# Patient Record
Sex: MALE | Race: WHITE | NOT HISPANIC OR LATINO | URBAN - METROPOLITAN AREA
[De-identification: names, ages, dates, MRNs, and addresses within clinical notes are randomized per-mention and may not be internally consistent; named-entity substitution may affect disease eponyms.]

---

## 2018-09-28 ENCOUNTER — INPATIENT (INPATIENT)
Facility: HOSPITAL | Age: 53
LOS: 2 days | Discharge: HOME | End: 2018-10-01
Attending: SURGERY | Admitting: SURGERY

## 2018-09-28 VITALS
SYSTOLIC BLOOD PRESSURE: 178 MMHG | RESPIRATION RATE: 22 BRPM | OXYGEN SATURATION: 97 % | DIASTOLIC BLOOD PRESSURE: 105 MMHG | HEART RATE: 78 BPM | TEMPERATURE: 97 F | WEIGHT: 259.93 LBS | HEIGHT: 74 IN

## 2018-09-28 LAB
ALBUMIN SERPL ELPH-MCNC: 3.9 G/DL — SIGNIFICANT CHANGE UP (ref 3.5–5.2)
ALBUMIN SERPL ELPH-MCNC: 4.7 G/DL — SIGNIFICANT CHANGE UP (ref 3.5–5.2)
ALP SERPL-CCNC: 113 U/L — SIGNIFICANT CHANGE UP (ref 30–115)
ALP SERPL-CCNC: 79 U/L — SIGNIFICANT CHANGE UP (ref 30–115)
ALT FLD-CCNC: 60 U/L — HIGH (ref 0–41)
ALT FLD-CCNC: 79 U/L — HIGH (ref 0–41)
ANION GAP SERPL CALC-SCNC: 15 MMOL/L — HIGH (ref 7–14)
ANION GAP SERPL CALC-SCNC: 17 MMOL/L — HIGH (ref 7–14)
AST SERPL-CCNC: 27 U/L — SIGNIFICANT CHANGE UP (ref 0–41)
AST SERPL-CCNC: 36 U/L — SIGNIFICANT CHANGE UP (ref 0–41)
BASOPHILS # BLD AUTO: 0.03 K/UL — SIGNIFICANT CHANGE UP (ref 0–0.2)
BASOPHILS # BLD AUTO: 0.03 K/UL — SIGNIFICANT CHANGE UP (ref 0–0.2)
BASOPHILS NFR BLD AUTO: 0.2 % — SIGNIFICANT CHANGE UP (ref 0–1)
BASOPHILS NFR BLD AUTO: 0.3 % — SIGNIFICANT CHANGE UP (ref 0–1)
BILIRUB DIRECT SERPL-MCNC: 0.4 MG/DL — HIGH (ref 0–0.2)
BILIRUB INDIRECT FLD-MCNC: 1.5 MG/DL — HIGH (ref 0.2–1.2)
BILIRUB SERPL-MCNC: 1.3 MG/DL — HIGH (ref 0.2–1.2)
BILIRUB SERPL-MCNC: 1.9 MG/DL — HIGH (ref 0.2–1.2)
BLD GP AB SCN SERPL QL: SIGNIFICANT CHANGE UP
BUN SERPL-MCNC: 10 MG/DL — SIGNIFICANT CHANGE UP (ref 10–20)
BUN SERPL-MCNC: 12 MG/DL — SIGNIFICANT CHANGE UP (ref 10–20)
CALCIUM SERPL-MCNC: 8.7 MG/DL — SIGNIFICANT CHANGE UP (ref 8.5–10.1)
CALCIUM SERPL-MCNC: 9.6 MG/DL — SIGNIFICANT CHANGE UP (ref 8.5–10.1)
CHLORIDE SERPL-SCNC: 102 MMOL/L — SIGNIFICANT CHANGE UP (ref 98–110)
CHLORIDE SERPL-SCNC: 103 MMOL/L — SIGNIFICANT CHANGE UP (ref 98–110)
CO2 SERPL-SCNC: 22 MMOL/L — SIGNIFICANT CHANGE UP (ref 17–32)
CO2 SERPL-SCNC: 24 MMOL/L — SIGNIFICANT CHANGE UP (ref 17–32)
CREAT SERPL-MCNC: 1 MG/DL — SIGNIFICANT CHANGE UP (ref 0.7–1.5)
CREAT SERPL-MCNC: 1 MG/DL — SIGNIFICANT CHANGE UP (ref 0.7–1.5)
EOSINOPHIL # BLD AUTO: 0.11 K/UL — SIGNIFICANT CHANGE UP (ref 0–0.7)
EOSINOPHIL # BLD AUTO: 0.13 K/UL — SIGNIFICANT CHANGE UP (ref 0–0.7)
EOSINOPHIL NFR BLD AUTO: 0.9 % — SIGNIFICANT CHANGE UP (ref 0–8)
EOSINOPHIL NFR BLD AUTO: 1.1 % — SIGNIFICANT CHANGE UP (ref 0–8)
GAS PNL BLDV: SIGNIFICANT CHANGE UP
GAS PNL BLDV: SIGNIFICANT CHANGE UP
GLUCOSE SERPL-MCNC: 109 MG/DL — HIGH (ref 70–99)
GLUCOSE SERPL-MCNC: 173 MG/DL — HIGH (ref 70–99)
HCT VFR BLD CALC: 41.7 % — LOW (ref 42–52)
HCT VFR BLD CALC: 46.1 % — SIGNIFICANT CHANGE UP (ref 42–52)
HGB BLD-MCNC: 14.5 G/DL — SIGNIFICANT CHANGE UP (ref 14–18)
HGB BLD-MCNC: 16.2 G/DL — SIGNIFICANT CHANGE UP (ref 14–18)
IMM GRANULOCYTES NFR BLD AUTO: 0.3 % — SIGNIFICANT CHANGE UP (ref 0.1–0.3)
IMM GRANULOCYTES NFR BLD AUTO: 0.4 % — HIGH (ref 0.1–0.3)
LACTATE SERPL-SCNC: 1.2 MMOL/L — SIGNIFICANT CHANGE UP (ref 0.5–2.2)
LACTATE SERPL-SCNC: 3.4 MMOL/L — HIGH (ref 0.5–2.2)
LIDOCAIN IGE QN: 41 U/L — SIGNIFICANT CHANGE UP (ref 7–60)
LYMPHOCYTES # BLD AUTO: 1.97 K/UL — SIGNIFICANT CHANGE UP (ref 1.2–3.4)
LYMPHOCYTES # BLD AUTO: 15.9 % — LOW (ref 20.5–51.1)
LYMPHOCYTES # BLD AUTO: 2.82 K/UL — SIGNIFICANT CHANGE UP (ref 1.2–3.4)
LYMPHOCYTES # BLD AUTO: 24.1 % — SIGNIFICANT CHANGE UP (ref 20.5–51.1)
MAGNESIUM SERPL-MCNC: 1.8 MG/DL — SIGNIFICANT CHANGE UP (ref 1.8–2.4)
MAGNESIUM SERPL-MCNC: 2 MG/DL — SIGNIFICANT CHANGE UP (ref 1.8–2.4)
MCHC RBC-ENTMCNC: 30.6 PG — SIGNIFICANT CHANGE UP (ref 27–31)
MCHC RBC-ENTMCNC: 30.7 PG — SIGNIFICANT CHANGE UP (ref 27–31)
MCHC RBC-ENTMCNC: 34.8 G/DL — SIGNIFICANT CHANGE UP (ref 32–37)
MCHC RBC-ENTMCNC: 35.1 G/DL — SIGNIFICANT CHANGE UP (ref 32–37)
MCV RBC AUTO: 87.5 FL — SIGNIFICANT CHANGE UP (ref 80–94)
MCV RBC AUTO: 88 FL — SIGNIFICANT CHANGE UP (ref 80–94)
MONOCYTES # BLD AUTO: 0.8 K/UL — HIGH (ref 0.1–0.6)
MONOCYTES # BLD AUTO: 1.29 K/UL — HIGH (ref 0.1–0.6)
MONOCYTES NFR BLD AUTO: 11 % — HIGH (ref 1.7–9.3)
MONOCYTES NFR BLD AUTO: 6.5 % — SIGNIFICANT CHANGE UP (ref 1.7–9.3)
NEUTROPHILS # BLD AUTO: 7.39 K/UL — HIGH (ref 1.4–6.5)
NEUTROPHILS # BLD AUTO: 9.42 K/UL — HIGH (ref 1.4–6.5)
NEUTROPHILS NFR BLD AUTO: 63.1 % — SIGNIFICANT CHANGE UP (ref 42.2–75.2)
NEUTROPHILS NFR BLD AUTO: 76.2 % — HIGH (ref 42.2–75.2)
NRBC # BLD: 0 /100 WBCS — SIGNIFICANT CHANGE UP (ref 0–0)
PHOSPHATE SERPL-MCNC: 2.5 MG/DL — SIGNIFICANT CHANGE UP (ref 2.1–4.9)
PLATELET # BLD AUTO: 217 K/UL — SIGNIFICANT CHANGE UP (ref 130–400)
PLATELET # BLD AUTO: 252 K/UL — SIGNIFICANT CHANGE UP (ref 130–400)
POTASSIUM SERPL-MCNC: 3.9 MMOL/L — SIGNIFICANT CHANGE UP (ref 3.5–5)
POTASSIUM SERPL-MCNC: 4 MMOL/L — SIGNIFICANT CHANGE UP (ref 3.5–5)
POTASSIUM SERPL-SCNC: 3.9 MMOL/L — SIGNIFICANT CHANGE UP (ref 3.5–5)
POTASSIUM SERPL-SCNC: 4 MMOL/L — SIGNIFICANT CHANGE UP (ref 3.5–5)
PROT SERPL-MCNC: 6.1 G/DL — SIGNIFICANT CHANGE UP (ref 6–8)
PROT SERPL-MCNC: 7.7 G/DL — SIGNIFICANT CHANGE UP (ref 6–8)
RBC # BLD: 4.74 M/UL — SIGNIFICANT CHANGE UP (ref 4.7–6.1)
RBC # BLD: 5.27 M/UL — SIGNIFICANT CHANGE UP (ref 4.7–6.1)
RBC # FLD: 11.9 % — SIGNIFICANT CHANGE UP (ref 11.5–14.5)
RBC # FLD: 12.1 % — SIGNIFICANT CHANGE UP (ref 11.5–14.5)
SODIUM SERPL-SCNC: 141 MMOL/L — SIGNIFICANT CHANGE UP (ref 135–146)
SODIUM SERPL-SCNC: 142 MMOL/L — SIGNIFICANT CHANGE UP (ref 135–146)
TROPONIN T SERPL-MCNC: <0.01 NG/ML — SIGNIFICANT CHANGE UP
TYPE + AB SCN PNL BLD: SIGNIFICANT CHANGE UP
WBC # BLD: 11.71 K/UL — HIGH (ref 4.8–10.8)
WBC # BLD: 12.37 K/UL — HIGH (ref 4.8–10.8)
WBC # FLD AUTO: 11.71 K/UL — HIGH (ref 4.8–10.8)
WBC # FLD AUTO: 12.37 K/UL — HIGH (ref 4.8–10.8)

## 2018-09-28 RX ORDER — HEPARIN SODIUM 5000 [USP'U]/ML
5000 INJECTION INTRAVENOUS; SUBCUTANEOUS EVERY 8 HOURS
Qty: 0 | Refills: 0 | Status: DISCONTINUED | OUTPATIENT
Start: 2018-09-28 | End: 2018-10-01

## 2018-09-28 RX ORDER — MORPHINE SULFATE 50 MG/1
4 CAPSULE, EXTENDED RELEASE ORAL ONCE
Qty: 0 | Refills: 0 | Status: DISCONTINUED | OUTPATIENT
Start: 2018-09-28 | End: 2018-09-28

## 2018-09-28 RX ORDER — ACETAMINOPHEN 500 MG
650 TABLET ORAL EVERY 6 HOURS
Qty: 0 | Refills: 0 | Status: DISCONTINUED | OUTPATIENT
Start: 2018-09-28 | End: 2018-10-01

## 2018-09-28 RX ORDER — METRONIDAZOLE 500 MG
500 TABLET ORAL EVERY 8 HOURS
Qty: 0 | Refills: 0 | Status: DISCONTINUED | OUTPATIENT
Start: 2018-09-28 | End: 2018-09-28

## 2018-09-28 RX ORDER — ONDANSETRON 8 MG/1
4 TABLET, FILM COATED ORAL EVERY 6 HOURS
Qty: 0 | Refills: 0 | Status: DISCONTINUED | OUTPATIENT
Start: 2018-09-28 | End: 2018-10-01

## 2018-09-28 RX ORDER — SODIUM CHLORIDE 9 MG/ML
1000 INJECTION, SOLUTION INTRAVENOUS
Qty: 0 | Refills: 0 | Status: DISCONTINUED | OUTPATIENT
Start: 2018-09-28 | End: 2018-09-29

## 2018-09-28 RX ORDER — IBUPROFEN 200 MG
400 TABLET ORAL EVERY 8 HOURS
Qty: 0 | Refills: 0 | Status: DISCONTINUED | OUTPATIENT
Start: 2018-09-28 | End: 2018-10-01

## 2018-09-28 RX ORDER — CIPROFLOXACIN LACTATE 400MG/40ML
400 VIAL (ML) INTRAVENOUS EVERY 12 HOURS
Qty: 0 | Refills: 0 | Status: DISCONTINUED | OUTPATIENT
Start: 2018-09-28 | End: 2018-09-28

## 2018-09-28 RX ORDER — MORPHINE SULFATE 50 MG/1
2 CAPSULE, EXTENDED RELEASE ORAL EVERY 4 HOURS
Qty: 0 | Refills: 0 | Status: DISCONTINUED | OUTPATIENT
Start: 2018-09-28 | End: 2018-09-28

## 2018-09-28 RX ORDER — INFLUENZA VIRUS VACCINE 15; 15; 15; 15 UG/.5ML; UG/.5ML; UG/.5ML; UG/.5ML
0.5 SUSPENSION INTRAMUSCULAR ONCE
Qty: 0 | Refills: 0 | Status: DISCONTINUED | OUTPATIENT
Start: 2018-09-28 | End: 2018-10-01

## 2018-09-28 RX ORDER — AMPICILLIN SODIUM AND SULBACTAM SODIUM 250; 125 MG/ML; MG/ML
3 INJECTION, POWDER, FOR SUSPENSION INTRAMUSCULAR; INTRAVENOUS EVERY 6 HOURS
Qty: 0 | Refills: 0 | Status: DISCONTINUED | OUTPATIENT
Start: 2018-09-28 | End: 2018-10-01

## 2018-09-28 RX ORDER — ONDANSETRON 8 MG/1
4 TABLET, FILM COATED ORAL ONCE
Qty: 0 | Refills: 0 | Status: COMPLETED | OUTPATIENT
Start: 2018-09-28 | End: 2018-09-28

## 2018-09-28 RX ORDER — SODIUM CHLORIDE 9 MG/ML
1000 INJECTION, SOLUTION INTRAVENOUS ONCE
Qty: 0 | Refills: 0 | Status: COMPLETED | OUTPATIENT
Start: 2018-09-28 | End: 2018-09-28

## 2018-09-28 RX ORDER — CHLORHEXIDINE GLUCONATE 213 G/1000ML
1 SOLUTION TOPICAL
Qty: 0 | Refills: 0 | Status: DISCONTINUED | OUTPATIENT
Start: 2018-09-28 | End: 2018-10-01

## 2018-09-28 RX ORDER — HYDROMORPHONE HYDROCHLORIDE 2 MG/ML
1 INJECTION INTRAMUSCULAR; INTRAVENOUS; SUBCUTANEOUS EVERY 4 HOURS
Qty: 0 | Refills: 0 | Status: DISCONTINUED | OUTPATIENT
Start: 2018-09-28 | End: 2018-10-01

## 2018-09-28 RX ORDER — MORPHINE SULFATE 50 MG/1
6 CAPSULE, EXTENDED RELEASE ORAL ONCE
Qty: 0 | Refills: 0 | Status: DISCONTINUED | OUTPATIENT
Start: 2018-09-28 | End: 2018-09-28

## 2018-09-28 RX ADMIN — ONDANSETRON 4 MILLIGRAM(S): 8 TABLET, FILM COATED ORAL at 02:40

## 2018-09-28 RX ADMIN — SODIUM CHLORIDE 100 MILLILITER(S): 9 INJECTION, SOLUTION INTRAVENOUS at 22:24

## 2018-09-28 RX ADMIN — MORPHINE SULFATE 4 MILLIGRAM(S): 50 CAPSULE, EXTENDED RELEASE ORAL at 03:48

## 2018-09-28 RX ADMIN — SODIUM CHLORIDE 1000 MILLILITER(S): 9 INJECTION, SOLUTION INTRAVENOUS at 03:47

## 2018-09-28 RX ADMIN — MORPHINE SULFATE 6 MILLIGRAM(S): 50 CAPSULE, EXTENDED RELEASE ORAL at 03:56

## 2018-09-28 RX ADMIN — MORPHINE SULFATE 2 MILLIGRAM(S): 50 CAPSULE, EXTENDED RELEASE ORAL at 10:30

## 2018-09-28 RX ADMIN — SODIUM CHLORIDE 1000 MILLILITER(S): 9 INJECTION, SOLUTION INTRAVENOUS at 02:40

## 2018-09-28 RX ADMIN — MORPHINE SULFATE 2 MILLIGRAM(S): 50 CAPSULE, EXTENDED RELEASE ORAL at 10:15

## 2018-09-28 RX ADMIN — SODIUM CHLORIDE 1000 MILLILITER(S): 9 INJECTION, SOLUTION INTRAVENOUS at 04:13

## 2018-09-28 RX ADMIN — Medication 200 MILLIGRAM(S): at 04:55

## 2018-09-28 RX ADMIN — MORPHINE SULFATE 4 MILLIGRAM(S): 50 CAPSULE, EXTENDED RELEASE ORAL at 02:40

## 2018-09-28 RX ADMIN — Medication 650 MILLIGRAM(S): at 14:24

## 2018-09-28 RX ADMIN — Medication 100 MILLIGRAM(S): at 04:55

## 2018-09-28 RX ADMIN — AMPICILLIN SODIUM AND SULBACTAM SODIUM 200 GRAM(S): 250; 125 INJECTION, POWDER, FOR SUSPENSION INTRAMUSCULAR; INTRAVENOUS at 23:40

## 2018-09-28 RX ADMIN — AMPICILLIN SODIUM AND SULBACTAM SODIUM 200 GRAM(S): 250; 125 INJECTION, POWDER, FOR SUSPENSION INTRAMUSCULAR; INTRAVENOUS at 18:49

## 2018-09-28 RX ADMIN — SODIUM CHLORIDE 1000 MILLILITER(S): 9 INJECTION, SOLUTION INTRAVENOUS at 03:48

## 2018-09-28 RX ADMIN — HYDROMORPHONE HYDROCHLORIDE 1 MILLIGRAM(S): 2 INJECTION INTRAMUSCULAR; INTRAVENOUS; SUBCUTANEOUS at 23:00

## 2018-09-28 RX ADMIN — HYDROMORPHONE HYDROCHLORIDE 1 MILLIGRAM(S): 2 INJECTION INTRAMUSCULAR; INTRAVENOUS; SUBCUTANEOUS at 22:22

## 2018-09-28 RX ADMIN — HEPARIN SODIUM 5000 UNIT(S): 5000 INJECTION INTRAVENOUS; SUBCUTANEOUS at 22:23

## 2018-09-28 RX ADMIN — Medication 650 MILLIGRAM(S): at 15:00

## 2018-09-28 NOTE — H&P ADULT - ASSESSMENT
ASSESSMENT:  53y Male with acute onset abdominal pain and US findings of cholecystitis    PLAN:     Admit  F/u final US report  F/u GI  F/u possible ERCP/MRCP  Possible lap peter later this admission

## 2018-09-28 NOTE — ED ADULT NURSE NOTE - OBJECTIVE STATEMENT
Pt c/o epigastric pain and nausea around that woke him from his sleep around 9pm. Denies N/V/D/F/C/SOB.

## 2018-09-28 NOTE — CONSULT NOTE ADULT - SUBJECTIVE AND OBJECTIVE BOX
General Surgery Consultation Note  =====================================================  HPI: 53y Male w/ no PMH/PSH presents with acute onset of epigastric pain around 9pm last night. Hx obtained from pt and son who is fluent in english. Patient states he was the pain is associated with mild nausea. No vom. Denies having this type of pain before. Non-radiating. Denies fevers/chills, sob, chest pain, diarrhea/constipation, hematuria, dysuria, back pain, previous abdominal surgeries.      PAST MEDICAL & SURGICAL HISTORY:  No pertinent past medical history  No significant past surgical history    Home Meds: Home Medications:    None    Allergies: Allergies    No Known Allergies      ROS:    REVIEW OF SYSTEMS    [x] A ten-point review of systems was otherwise negative except as noted.  [ ] Due to altered mental status/intubation, subjective information were not able to be obtained from the patient. History was obtained, to the extent possible, from review of the chart and collateral sources of information.      CURRENT MEDICATIONS:   --------------------------------------------------------------------------------------  Neurologic Medications    Respiratory Medications    Cardiovascular Medications    Gastrointestinal Medications    Genitourinary Medications    Hematologic/Oncologic Medications    Antimicrobial/Immunologic Medications  ciprofloxacin   IVPB 400 milliGRAM(s) IV Intermittent every 12 hours  metroNIDAZOLE  IVPB 500 milliGRAM(s) IV Intermittent every 8 hours    Endocrine/Metabolic Medications    Topical/Other Medications    --------------------------------------------------------------------------------------    VITAL SIGNS, INS/OUTS (last 24 hours):  --------------------------------------------------------------------------------------  ICU Vital Signs Last 24 Hrs  T(C): 35.9 (28 Sep 2018 01:20), Max: 35.9 (28 Sep 2018 01:20)  T(F): 96.7 (28 Sep 2018 01:20), Max: 96.7 (28 Sep 2018 01:20)  HR: 78 (28 Sep 2018 01:20) (78 - 78)  BP: 178/105 (28 Sep 2018 01:20) (178/105 - 178/105)  BP(mean): --  ABP: --  ABP(mean): --  RR: 22 (28 Sep 2018 01:20) (22 - 22)  SpO2: 97% (28 Sep 2018 01:20) (97% - 97%)    I&O's Summary    --------------------------------------------------------------------------------------  PHYSICAL EXAM    General: NAD, AAOx3, calm and cooperative  HEENT: NCAT, JERRY, EOMI, Trachea ML, Neck supple  Cardiac: RRR S1, S2, no Murmurs, rubs or gallops  Respiratory: CTAB, normal respiratory effort, breath sounds equal BL, no wheeze, rhonchi or crackles  Abdomen: Soft, obese, mildly distended, TTP in the epigastrium  Musculoskeletal: Strength 5/5 BL UE/LE, ROM intact, compartments soft  Neuro: Sensation grossly intact and equal throughout, CN II-XII intact, no focal deficits  Vascular: Pulses 2+ throughout, extremities well perfused  Skin: Warm/dry, normal color, no jaundice      LABS  --------------------------------------------------------------------------------------  Labs:  CAPILLARY BLOOD GLUCOSE                              16.2   12.37 )-----------( 252      ( 28 Sep 2018 02:17 )             46.1       Auto Neutrophil %: 76.2 % (09-28-18 @ 02:17)  Auto Immature Granulocyte %: 0.3 % (09-28-18 @ 02:17)    09-28    142  |  103  |  12  ----------------------------<  173<H>  3.9   |  24  |  1.0      Calcium, Total Serum: 9.6 mg/dL (09-28-18 @ 02:17)      LFTs:             7.7  | 1.3  | 36       ------------------[113     ( 28 Sep 2018 02:17 )  4.7  | x    | 79          Lipase:41     Amylase:x         Blood Gas Venous - Lactate: 2.1 mmoL/L (09-28-18 @ 05:08)  Blood Gas Venous - Lactate: 2.9 mmoL/L (09-28-18 @ 03:18)  Lactate, Blood: 3.4 mmol/L (09-28-18 @ 02:17)      Coags:    CARDIAC MARKERS ( 28 Sep 2018 02:17 )  x     / <0.01 ng/mL / x     / x     / x            --------------------------------------------------------------------------------------    OTHER LABS    IMAGING RESULTS    < from: US Abdomen Limited (09.28.18 @ 03:32) >  Cholelithiasis without sonographic evidence for cholecystitis.    Dilated common bile duct 1.1 cm, correlate with LFTs.    < end of copied text >      ---------------------------------------------------------------------------------------    ASSESSMENT:  53y Male with acute onset abdominal pain and US findings of cholecystitis    PLAN:     Admit  F/u final US report  F/u GI  F/u possible ERCP/MRCP  Possible lap peter later this admission      --------------------------------------------------------------------------------------    09-28-18 @ 06:15

## 2018-09-28 NOTE — ED PROVIDER NOTE - ATTENDING CONTRIBUTION TO CARE
I personally evaluated the patient. I reviewed the Resident’s or Physician Assistant’s note (as assigned above), and agree with the findings and plan except as documented in my note.  53 yr M w/o any significant pmhx presents with complaints of acute onset epigastric abd pain for the past 4 hours. No associated nausea, vomiting, f/c/CP, SOB. Pt is non smoker. VS reviewed, pt appears very uncomfortable due to pain, NAD. Head ncat, neck supple, no JVD, normal s1s2 without any murmurs, Lungs CTAB with normal work of breathing. abd +BS, s/nd, + ttp of the RUQ and epigastrium w/o guarding or rebound, extremities wnl, neuro exam grossly normal. No acute skin rashes. Plan is EKG, labs, CXR and RUQ US and reassess.

## 2018-09-28 NOTE — ED PROVIDER NOTE - OBJECTIVE STATEMENT
54 y/o male with no pmhx presents with acute onset of epig 52 y/o male with no pmhx presents with acute onset of epigastric pain around 9pm tonight. Patient states he was woken up from this pain, located in epigastric region, associated with mild nausea. Denies having 54 y/o male with no pmhx presents with acute onset of epigastric pain around 9pm tonight. Patient states he was woken up from this pain, located in epigastric region, associated with mild nausea. Denies having this type of pain before. Denies fevers/chills, sob, chest pain, diarrhea/constipation, hematuria, dysuria, back pain, abdominal surgeries.

## 2018-09-28 NOTE — H&P ADULT - HISTORY OF PRESENT ILLNESS
HPI: 53y Male w/ no PMH/PSH presents with acute onset of epigastric pain around 9pm last night. Hx obtained from pt and son who is fluent in english. Patient states he was the pain is associated with mild nausea. No vom. Denies having this type of pain before. Non-radiating. Denies fevers/chills, sob, chest pain, diarrhea/constipation, hematuria, dysuria, back pain, previous abdominal surgeries.      PAST MEDICAL & SURGICAL HISTORY:  No pertinent past medical history  No significant past surgical history    Home Meds: Home Medications:    None    Allergies: Allergies    No Known Allergies      ROS:    REVIEW OF SYSTEMS    [x] A ten-point review of systems was otherwise negative except as noted.  [ ] Due to altered mental status/intubation, subjective information were not able to be obtained from the patient. History was obtained, to the extent possible, from review of the chart and collateral sources of information.      CURRENT MEDICATIONS:   --------------------------------------------------------------------------------------  Neurologic Medications    Respiratory Medications    Cardiovascular Medications    Gastrointestinal Medications    Genitourinary Medications    Hematologic/Oncologic Medications    Antimicrobial/Immunologic Medications  ciprofloxacin   IVPB 400 milliGRAM(s) IV Intermittent every 12 hours  metroNIDAZOLE  IVPB 500 milliGRAM(s) IV Intermittent every 8 hours    Endocrine/Metabolic Medications    Topical/Other Medications    --------------------------------------------------------------------------------------    VITAL SIGNS, INS/OUTS (last 24 hours):  --------------------------------------------------------------------------------------  ICU Vital Signs Last 24 Hrs  T(C): 35.9 (28 Sep 2018 01:20), Max: 35.9 (28 Sep 2018 01:20)  T(F): 96.7 (28 Sep 2018 01:20), Max: 96.7 (28 Sep 2018 01:20)  HR: 78 (28 Sep 2018 01:20) (78 - 78)  BP: 178/105 (28 Sep 2018 01:20) (178/105 - 178/105)  BP(mean): --  ABP: --  ABP(mean): --  RR: 22 (28 Sep 2018 01:20) (22 - 22)  SpO2: 97% (28 Sep 2018 01:20) (97% - 97%)    I&O's Summary    --------------------------------------------------------------------------------------  PHYSICAL EXAM    General: NAD, AAOx3, calm and cooperative  HEENT: NCAT, JERRY, EOMI, Trachea ML, Neck supple  Cardiac: RRR S1, S2, no Murmurs, rubs or gallops  Respiratory: CTAB, normal respiratory effort, breath sounds equal BL, no wheeze, rhonchi or crackles  Abdomen: Soft, obese, mildly distended, TTP in the epigastrium  Musculoskeletal: Strength 5/5 BL UE/LE, ROM intact, compartments soft  Neuro: Sensation grossly intact and equal throughout, CN II-XII intact, no focal deficits  Vascular: Pulses 2+ throughout, extremities well perfused  Skin: Warm/dry, normal color, no jaundice      LABS  --------------------------------------------------------------------------------------  Labs:  CAPILLARY BLOOD GLUCOSE                              16.2   12.37 )-----------( 252      ( 28 Sep 2018 02:17 )             46.1       Auto Neutrophil %: 76.2 % (09-28-18 @ 02:17)  Auto Immature Granulocyte %: 0.3 % (09-28-18 @ 02:17)    09-28    142  |  103  |  12  ----------------------------<  173<H>  3.9   |  24  |  1.0      Calcium, Total Serum: 9.6 mg/dL (09-28-18 @ 02:17)      LFTs:             7.7  | 1.3  | 36       ------------------[113     ( 28 Sep 2018 02:17 )  4.7  | x    | 79          Lipase:41     Amylase:x         Blood Gas Venous - Lactate: 2.1 mmoL/L (09-28-18 @ 05:08)  Blood Gas Venous - Lactate: 2.9 mmoL/L (09-28-18 @ 03:18)  Lactate, Blood: 3.4 mmol/L (09-28-18 @ 02:17)      Coags:    CARDIAC MARKERS ( 28 Sep 2018 02:17 )  x     / <0.01 ng/mL / x     / x     / x            --------------------------------------------------------------------------------------    OTHER LABS    IMAGING RESULTS    < from: US Abdomen Limited (09.28.18 @ 03:32) >  Cholelithiasis without sonographic evidence for cholecystitis.    Dilated common bile duct 1.1 cm, correlate with LFTs.    < end of copied text >

## 2018-09-28 NOTE — ED ADULT NURSE NOTE - NSIMPLEMENTINTERV_GEN_ALL_ED
Implemented All Universal Safety Interventions:  [place] to call system. Call bell, personal items and telephone within reach. Instruct patient to call for assistance. Room bathroom lighting operational. Non-slip footwear when patient is off stretcher. Physically safe environment: no spills, clutter or unnecessary equipment. Stretcher in lowest position, wheels locked, appropriate side rails in place.

## 2018-09-28 NOTE — CONSULT NOTE ADULT - ASSESSMENT
53 year old male with insignificant PMH came presenting with epigastric and RUQ severe pain. U/S showed a 1.5 cm stone in the neck of the GB, CBD 1.1 cm.  - Acute cholecystitis, rule out CBD stones:  Plan:  NPO for now  monitor LFTs  IV Abx  Pain control  Follow up with surgery for cholecystectomy  may consider later MRCP to evaluate for choledocholithiasis

## 2018-09-28 NOTE — ED PROVIDER NOTE - NS ED ROS FT
Constitutional: See HPI.  Eyes: No visual changes, eye pain or discharge.  ENMT: No hearing changes, pain, discharge or infections. No neck pain or stiffness.  Cardiac: No chest pain, SOB or edema. No chest pain with exertion.  Respiratory: No cough or respiratory distress.   GI: + nausea, abdominal pain; No vomiting, diarrhea  : No dysuria, frequency or burning.  MS: No myalgia, muscle weakness, joint pain or back pain.  Neuro: No headache or weakness. No LOC.  Skin: No skin rash.  Endo: No hx of DM, thyroid disease  Except as documented in HPI, all other review of systems is negative

## 2018-09-28 NOTE — CONSULT NOTE ADULT - SUBJECTIVE AND OBJECTIVE BOX
Chief Complaint:  Patient is a 53y old  Male who presents with a chief complaint of abdominal pain.    HPI:  53y Male with no PMH/PSH presents with acute onset of epigastric pain around 9pm last night. Hx obtained from pt and son who is fluent in english. Patient states he was the pain is associated with mild nausea. No vom. Denies having this type of pain before. Non-radiating. Denies fevers/chills, sob, chest pain, diarrhea/constipation, hematuria, dysuria, back pain, previous abdominal surgeries.    Allergies:  No Known Allergies      Home Medications:    Hospital Medications:  ciprofloxacin   IVPB 400 milliGRAM(s) IV Intermittent every 12 hours  metroNIDAZOLE  IVPB 500 milliGRAM(s) IV Intermittent every 8 hours      PMHX/PSHX:  No pertinent past medical history  No significant past surgical history      Family history:      Social History:     ROS:     General:  No wt loss, fevers, chills, night sweats, fatigue,   Eyes:  Good vision, no reported pain  ENT:  No sore throat, pain, runny nose, dysphagia  CV:  No pain, palpitations, hypo/hypertension  Resp:  No dyspnea, cough, tachypnea, wheezing  GI:  No pain, No nausea, No vomiting, No diarrhea, No constipation, No weight loss, No fever, No pruritis, No rectal bleeding, No tarry stools, No dysphagia,  :  No pain, bleeding, incontinence, nocturia  Muscle:  No pain, weakness  Neuro:  No weakness, tingling, memory problems  Psych:  No fatigue, insomnia, mood problems, depression  Endocrine:  No polyuria, polydipsia, cold/heat intolerance  Heme:  No petechiae, ecchymosis, easy bruisability  Skin:  No rash, tattoos, scars, edema      PHYSICAL EXAM:   Vital Signs:  Vital Signs Last 24 Hrs  T(C): 36.6 (28 Sep 2018 07:37), Max: 36.6 (28 Sep 2018 07:37)  T(F): 97.9 (28 Sep 2018 07:37), Max: 97.9 (28 Sep 2018 07:37)  HR: 99 (28 Sep 2018 07:37) (78 - 99)  BP: 162/107 (28 Sep 2018 07:37) (162/107 - 178/105)  BP(mean): --  RR: 20 (28 Sep 2018 07:37) (20 - 22)  SpO2: 95% (28 Sep 2018 07:37) (95% - 97%)  Daily Height in cm: 187.96 (28 Sep 2018 01:20)    Daily     GENERAL:  Appears stated age, well-groomed, well-nourished, no distress  HEENT:  NC/AT,  conjunctivae clear and pink, no thyromegaly, nodules, adenopathy, no JVD, sclera -anicteric  CHEST:  Full & symmetric excursion, no increased effort, breath sounds clear  HEART:  Regular rhythm, S1, S2, no murmur/rub/S3/S4, no abdominal bruit, no edema  ABDOMEN:  Soft, non-tender, non-distended, normoactive bowel sounds,  no masses ,no hepato-splenomegaly, no signs of chronic liver disease  EXTEREMITIES:  no cyanosis,clubbing or edema  SKIN:  No rash/erythema/ecchymoses/petechiae/wounds/abscess/warm/dry  NEURO:  Alert, oriented, no asterixis, no tremor, no encephalopathy    LABS:                        16.2   12.37 )-----------( 252      ( 28 Sep 2018 02:17 )             46.1     09-28    142  |  103  |  12  ----------------------------<  173<H>  3.9   |  24  |  1.0    Ca    9.6      28 Sep 2018 02:17  Mg     2.0     09-28    TPro  7.7  /  Alb  4.7  /  TBili  1.3<H>  /  DBili  x   /  AST  36  /  ALT  79<H>  /  AlkPhos  113  09-28    LIVER FUNCTIONS - ( 28 Sep 2018 02:17 )  Alb: 4.7 g/dL / Pro: 7.7 g/dL / ALK PHOS: 113 U/L / ALT: 79 U/L / AST: 36 U/L / GGT: x               Amylase Serum--      Lipase serum41       Ammonia--      Imaging: Chief Complaint:  Patient is a 53y old  Male who presents with a chief complaint of abdominal pain.    HPI:  53y Male with no PMH/PSH presents with acute onset of epigastric pain around 9pm last night. Patient states that  the pain was associated with mild nausea, pain is non radiating. No vomiting , no fever. Denies having this type of pain before. Denies fevers/chills, sob, chest pain, diarrhea/constipation, hematuria, dysuria, back pain, previous abdominal surgeries.    Allergies:  No Known Allergies    Hospital Medications:  ciprofloxacin   IVPB 400 milliGRAM(s) IV Intermittent every 12 hours  metroNIDAZOLE  IVPB 500 milliGRAM(s) IV Intermittent every 8 hours      PMHX/PSHX:  No pertinent past medical history  No significant past surgical history    ROS:   Eyes:  Good vision, no reported pain  ENT:  No sore throat, pain, runny nose, dysphagia  CV:  No pain, palpitations, hypo/hypertension  Resp:  No dyspnea, cough, tachypnea, wheezing  GI:  see H&P  :  No pain, bleeding, incontinence, nocturia  Muscle:  No pain, weakness  Neuro:  No weakness, tingling, memory problems  Psych:  No fatigue, insomnia, mood problems, depression  Endocrine:  No polyuria, polydipsia, cold/heat intolerance  Heme:  No petechiae, ecchymosis, easy bruisability  Skin:  No rash, tattoos, scars, edema      PHYSICAL EXAM:   Vital Signs:  Vital Signs Last 24 Hrs  T(C): 36.6 (28 Sep 2018 07:37), Max: 36.6 (28 Sep 2018 07:37)  T(F): 97.9 (28 Sep 2018 07:37), Max: 97.9 (28 Sep 2018 07:37)  HR: 99 (28 Sep 2018 07:37) (78 - 99)  BP: 162/107 (28 Sep 2018 07:37) (162/107 - 178/105)  BP(mean): --  RR: 20 (28 Sep 2018 07:37) (20 - 22)  SpO2: 95% (28 Sep 2018 07:37) (95% - 97%)  Daily Height in cm: 187.96 (28 Sep 2018 01:20)    Daily     GENERAL:  Appears stated age, well-groomed, well-nourished, no distress  HEENT:  NC/AT,  conjunctivae clear and pink, no thyromegaly, nodules, adenopathy, no JVD, sclera -anicteric  CHEST:  Full & symmetric excursion, no increased effort, breath sounds clear  HEART:  Regular rhythm, S1, S2, no murmur/rub/S3/S4, no abdominal bruit, no edema  ABDOMEN:  Soft, RUQ tenderness, non-distended, normoactive bowel sounds,  no masses ,no hepato-splenomegaly,   EXTEREMITIES:  no cyanosis,clubbing or edema  SKIN:  No rash/erythema/ecchymoses/petechiae/wounds/abscess/warm/dry  NEURO:  Alert, oriented, no asterixis, no tremor, no encephalopathy    LABS:                        16.2   12.37 )-----------( 252      ( 28 Sep 2018 02:17 )             46.1     09-28    142  |  103  |  12  ----------------------------<  173<H>  3.9   |  24  |  1.0    Ca    9.6      28 Sep 2018 02:17  Mg     2.0     09-28    TPro  7.7  /  Alb  4.7  /  TBili  1.3<H>  /  DBili  x   /  AST  36  /  ALT  79<H>  /  AlkPhos  113  09-28    LIVER FUNCTIONS - ( 28 Sep 2018 02:17 )  Alb: 4.7 g/dL / Pro: 7.7 g/dL / ALK PHOS: 113 U/L / ALT: 79 U/L / AST: 36 U/L / GGT: x               Amylase Serum--      Lipase serum41

## 2018-09-28 NOTE — ED PROVIDER NOTE - PHYSICAL EXAMINATION
CONSTITUTIONAL: Well-developed; well-nourished; in mild-moderate pain  SKIN: warm, dry  HEAD: Normocephalic; atraumatic.  EYES: no conj injection  ENT: No nasal discharge; airway clear.  CARD: S1, S2 normal; no murmurs, gallops, or rubs. Regular rate and rhythm.   RESP: No wheezes, rales or rhonchi.  ABD: +epigastric tenderness; soft, non-distended  EXT: Normal ROM.  No clubbing, cyanosis or edema.   NEURO: Alert, oriented, grossly unremarkable  PSYCH: Cooperative, appropriate.

## 2018-09-29 LAB
ALBUMIN SERPL ELPH-MCNC: 3.7 G/DL — SIGNIFICANT CHANGE UP (ref 3.5–5.2)
ALP SERPL-CCNC: 76 U/L — SIGNIFICANT CHANGE UP (ref 30–115)
ALT FLD-CCNC: 50 U/L — HIGH (ref 0–41)
ANION GAP SERPL CALC-SCNC: 12 MMOL/L — SIGNIFICANT CHANGE UP (ref 7–14)
AST SERPL-CCNC: 34 U/L — SIGNIFICANT CHANGE UP (ref 0–41)
BASOPHILS # BLD AUTO: 0.03 K/UL — SIGNIFICANT CHANGE UP (ref 0–0.2)
BASOPHILS NFR BLD AUTO: 0.3 % — SIGNIFICANT CHANGE UP (ref 0–1)
BILIRUB DIRECT SERPL-MCNC: 0.4 MG/DL — HIGH (ref 0–0.2)
BILIRUB INDIRECT FLD-MCNC: 1.9 MG/DL — HIGH (ref 0.2–1.2)
BILIRUB SERPL-MCNC: 2.3 MG/DL — HIGH (ref 0.2–1.2)
BUN SERPL-MCNC: 14 MG/DL — SIGNIFICANT CHANGE UP (ref 10–20)
CALCIUM SERPL-MCNC: 8.8 MG/DL — SIGNIFICANT CHANGE UP (ref 8.5–10.1)
CHLORIDE SERPL-SCNC: 102 MMOL/L — SIGNIFICANT CHANGE UP (ref 98–110)
CO2 SERPL-SCNC: 24 MMOL/L — SIGNIFICANT CHANGE UP (ref 17–32)
CREAT SERPL-MCNC: 1 MG/DL — SIGNIFICANT CHANGE UP (ref 0.7–1.5)
EOSINOPHIL # BLD AUTO: 0.28 K/UL — SIGNIFICANT CHANGE UP (ref 0–0.7)
EOSINOPHIL NFR BLD AUTO: 2.7 % — SIGNIFICANT CHANGE UP (ref 0–8)
GLUCOSE SERPL-MCNC: 114 MG/DL — HIGH (ref 70–99)
HCT VFR BLD CALC: 42.1 % — SIGNIFICANT CHANGE UP (ref 42–52)
HGB BLD-MCNC: 14.8 G/DL — SIGNIFICANT CHANGE UP (ref 14–18)
IMM GRANULOCYTES NFR BLD AUTO: 0.4 % — HIGH (ref 0.1–0.3)
LYMPHOCYTES # BLD AUTO: 2.93 K/UL — SIGNIFICANT CHANGE UP (ref 1.2–3.4)
LYMPHOCYTES # BLD AUTO: 27.9 % — SIGNIFICANT CHANGE UP (ref 20.5–51.1)
MAGNESIUM SERPL-MCNC: 2 MG/DL — SIGNIFICANT CHANGE UP (ref 1.8–2.4)
MCHC RBC-ENTMCNC: 31.3 PG — HIGH (ref 27–31)
MCHC RBC-ENTMCNC: 35.2 G/DL — SIGNIFICANT CHANGE UP (ref 32–37)
MCV RBC AUTO: 89 FL — SIGNIFICANT CHANGE UP (ref 80–94)
MONOCYTES # BLD AUTO: 1.21 K/UL — HIGH (ref 0.1–0.6)
MONOCYTES NFR BLD AUTO: 11.5 % — HIGH (ref 1.7–9.3)
NEUTROPHILS # BLD AUTO: 6 K/UL — SIGNIFICANT CHANGE UP (ref 1.4–6.5)
NEUTROPHILS NFR BLD AUTO: 57.2 % — SIGNIFICANT CHANGE UP (ref 42.2–75.2)
NRBC # BLD: 0 /100 WBCS — SIGNIFICANT CHANGE UP (ref 0–0)
PHOSPHATE SERPL-MCNC: 2.6 MG/DL — SIGNIFICANT CHANGE UP (ref 2.1–4.9)
PLATELET # BLD AUTO: 237 K/UL — SIGNIFICANT CHANGE UP (ref 130–400)
POTASSIUM SERPL-MCNC: 4.8 MMOL/L — SIGNIFICANT CHANGE UP (ref 3.5–5)
POTASSIUM SERPL-SCNC: 4.8 MMOL/L — SIGNIFICANT CHANGE UP (ref 3.5–5)
PROT SERPL-MCNC: 6 G/DL — SIGNIFICANT CHANGE UP (ref 6–8)
RBC # BLD: 4.73 M/UL — SIGNIFICANT CHANGE UP (ref 4.7–6.1)
RBC # FLD: 12.3 % — SIGNIFICANT CHANGE UP (ref 11.5–14.5)
SODIUM SERPL-SCNC: 138 MMOL/L — SIGNIFICANT CHANGE UP (ref 135–146)
WBC # BLD: 10.49 K/UL — SIGNIFICANT CHANGE UP (ref 4.8–10.8)
WBC # FLD AUTO: 10.49 K/UL — SIGNIFICANT CHANGE UP (ref 4.8–10.8)

## 2018-09-29 RX ORDER — SODIUM CHLORIDE 9 MG/ML
1000 INJECTION INTRAMUSCULAR; INTRAVENOUS; SUBCUTANEOUS
Qty: 0 | Refills: 0 | Status: DISCONTINUED | OUTPATIENT
Start: 2018-09-29 | End: 2018-09-30

## 2018-09-29 RX ORDER — SODIUM CHLORIDE 9 MG/ML
500 INJECTION INTRAMUSCULAR; INTRAVENOUS; SUBCUTANEOUS ONCE
Qty: 0 | Refills: 0 | Status: COMPLETED | OUTPATIENT
Start: 2018-09-29 | End: 2018-09-29

## 2018-09-29 RX ADMIN — SODIUM CHLORIDE 100 MILLILITER(S): 9 INJECTION, SOLUTION INTRAVENOUS at 06:10

## 2018-09-29 RX ADMIN — SODIUM CHLORIDE 500 MILLILITER(S): 9 INJECTION INTRAMUSCULAR; INTRAVENOUS; SUBCUTANEOUS at 12:59

## 2018-09-29 RX ADMIN — AMPICILLIN SODIUM AND SULBACTAM SODIUM 200 GRAM(S): 250; 125 INJECTION, POWDER, FOR SUSPENSION INTRAMUSCULAR; INTRAVENOUS at 23:01

## 2018-09-29 RX ADMIN — SODIUM CHLORIDE 100 MILLILITER(S): 9 INJECTION INTRAMUSCULAR; INTRAVENOUS; SUBCUTANEOUS at 21:22

## 2018-09-29 RX ADMIN — HEPARIN SODIUM 5000 UNIT(S): 5000 INJECTION INTRAVENOUS; SUBCUTANEOUS at 21:22

## 2018-09-29 RX ADMIN — SODIUM CHLORIDE 100 MILLILITER(S): 9 INJECTION INTRAMUSCULAR; INTRAVENOUS; SUBCUTANEOUS at 12:59

## 2018-09-29 RX ADMIN — AMPICILLIN SODIUM AND SULBACTAM SODIUM 200 GRAM(S): 250; 125 INJECTION, POWDER, FOR SUSPENSION INTRAMUSCULAR; INTRAVENOUS at 05:33

## 2018-09-29 RX ADMIN — HEPARIN SODIUM 5000 UNIT(S): 5000 INJECTION INTRAVENOUS; SUBCUTANEOUS at 05:28

## 2018-09-29 RX ADMIN — AMPICILLIN SODIUM AND SULBACTAM SODIUM 200 GRAM(S): 250; 125 INJECTION, POWDER, FOR SUSPENSION INTRAMUSCULAR; INTRAVENOUS at 17:58

## 2018-09-29 RX ADMIN — CHLORHEXIDINE GLUCONATE 1 APPLICATION(S): 213 SOLUTION TOPICAL at 05:28

## 2018-09-29 RX ADMIN — AMPICILLIN SODIUM AND SULBACTAM SODIUM 200 GRAM(S): 250; 125 INJECTION, POWDER, FOR SUSPENSION INTRAMUSCULAR; INTRAVENOUS at 11:06

## 2018-09-29 NOTE — PROGRESS NOTE ADULT - SUBJECTIVE AND OBJECTIVE BOX
Progress Note: General Surgery  Patient: DONYA RAMIREZ , 53y (1965)Male   MRN: [id number]  Location: [address]  Visit: 09-28-18 Inpatient  Date: 09-29-18 @ 04:32  Hospital Day: 2    Procedure/Diagnosis: Cholecystitis  Events over 24h: Patient seen and examiend bedside. Preop today for OR. Complains of RUQ epigastric pain and tenderness. NPO. No nausea or vomiting,.     Vitals: T(F): 100 (09-28-18 @ 20:39), Max: 100 (09-28-18 @ 20:39)  HR: 97 (09-28-18 @ 20:39)  BP: 125/78 (09-28-18 @ 20:39) (125/78 - 162/107)  RR: 20 (09-28-18 @ 20:39)  SpO2: 97% (09-28-18 @ 12:05)    In:   09-28-18 @ 07:01  -  09-29-18 @ 04:32  --------------------------------------------------------  IN: 1000 mL      Out:   09-28-18 @ 07:01  -  09-29-18 @ 04:32  --------------------------------------------------------  OUT:  Total OUT: 0 mL    Net:   09-28-18 @ 07:01  -  09-29-18 @ 04:32  --------------------------------------------------------  NET: 1000 mL      Diet: Diet, NPO (09-28-18 @ 10:42)  IV Fluids: yes , Tpe: lactated ringers. 1000 milliLiter(s) (100 mL/Hr) IV Continuous <Continuous>    Physical Examination:  General Appearance: NAD, alert and cooperative  HEENT: NCAT, WNL  Heart: S1 and S2. No murmurs. RRR  Lungs: Clear to auscultation BL without rales, rhonchi, wheezing, crackles or diminished breath sounds.  Abdomen:  Positive bowel sounds. Soft, nondistended, soft    Medications: [Standing]  ampicillin/sulbactam  IVPB 3 Gram(s) IV Intermittent every 6 hours  chlorhexidine 4% Liquid 1 Application(s) Topical <User Schedule>  heparin  Injectable 5000 Unit(s) SubCutaneous every 8 hours  influenza   Vaccine 0.5 milliLiter(s) IntraMuscular once  lactated ringers. 1000 milliLiter(s) (100 mL/Hr) IV Continuous <Continuous>    DVT Prophylaxis: heparin  Injectable 5000 Unit(s) SubCutaneous every 8 hours    GI Prophylaxis:   Antibiotics: ampicillin/sulbactam  IVPB 3 Gram(s) IV Intermittent every 6 hours    Anticoagulation:   Medications:[PRN]  acetaminophen   Tablet .. 650 milliGRAM(s) Oral every 6 hours PRN  HYDROmorphone  Injectable 1 milliGRAM(s) IV Push every 4 hours PRN  ibuprofen  Tablet. 400 milliGRAM(s) Oral every 8 hours PRN  ondansetron Injectable 4 milliGRAM(s) IV Push every 6 hours PRN    Labs:                        14.8   10.49 )-----------( 237      ( 29 Sep 2018 01:35 )             42.1     09-29    138  |  102  |  14  ----------------------------<  114<H>  4.8   |  24  |  1.0    Ca    8.8      29 Sep 2018 01:35  Phos  2.6     09-29  Mg     2.0     09-29    TPro  6.0  /  Alb  3.7  /  TBili  2.3<H>  /  DBili  0.4<H>  /  AST  34  /  ALT  50<H>  /  AlkPhos  76  09-29    LIVER FUNCTIONS - ( 29 Sep 2018 01:35 )  Alb: 3.7 g/dL / Pro: 6.0 g/dL / ALK PHOS: 76 U/L / ALT: 50 U/L / AST: 34 U/L / GGT: x           CARDIAC MARKERS ( 28 Sep 2018 02:17 )  x     / <0.01 ng/mL / x     / x     / x        Assessment:  53y Male patient admitted Cholecystitis    Plan:  -NPO  -IVF  -Preop  -Consented    Date/Time: 09-29-18 @ 04:32

## 2018-09-29 NOTE — PROGRESS NOTE ADULT - ATTENDING COMMENTS
bili elevated today.  in light of dilated CBD we will postpone lap peter until duct is cleared.  GI to consider ERCP, poss MRCP today

## 2018-09-30 LAB
ALBUMIN SERPL ELPH-MCNC: 3.7 G/DL — SIGNIFICANT CHANGE UP (ref 3.5–5.2)
ALP SERPL-CCNC: 75 U/L — SIGNIFICANT CHANGE UP (ref 30–115)
ALT FLD-CCNC: 42 U/L — HIGH (ref 0–41)
ANION GAP SERPL CALC-SCNC: 13 MMOL/L — SIGNIFICANT CHANGE UP (ref 7–14)
AST SERPL-CCNC: 19 U/L — SIGNIFICANT CHANGE UP (ref 0–41)
BASOPHILS # BLD AUTO: 0.03 K/UL — SIGNIFICANT CHANGE UP (ref 0–0.2)
BASOPHILS NFR BLD AUTO: 0.4 % — SIGNIFICANT CHANGE UP (ref 0–1)
BILIRUB DIRECT SERPL-MCNC: 0.5 MG/DL — HIGH (ref 0–0.2)
BILIRUB INDIRECT FLD-MCNC: 2.1 MG/DL — HIGH (ref 0.2–1.2)
BILIRUB SERPL-MCNC: 2.6 MG/DL — HIGH (ref 0.2–1.2)
BUN SERPL-MCNC: 10 MG/DL — SIGNIFICANT CHANGE UP (ref 10–20)
CALCIUM SERPL-MCNC: 8.9 MG/DL — SIGNIFICANT CHANGE UP (ref 8.5–10.1)
CHLORIDE SERPL-SCNC: 102 MMOL/L — SIGNIFICANT CHANGE UP (ref 98–110)
CO2 SERPL-SCNC: 23 MMOL/L — SIGNIFICANT CHANGE UP (ref 17–32)
CREAT SERPL-MCNC: 1 MG/DL — SIGNIFICANT CHANGE UP (ref 0.7–1.5)
EOSINOPHIL # BLD AUTO: 0.46 K/UL — SIGNIFICANT CHANGE UP (ref 0–0.7)
EOSINOPHIL NFR BLD AUTO: 6.2 % — SIGNIFICANT CHANGE UP (ref 0–8)
GLUCOSE SERPL-MCNC: 100 MG/DL — HIGH (ref 70–99)
HCT VFR BLD CALC: 43.1 % — SIGNIFICANT CHANGE UP (ref 42–52)
HGB BLD-MCNC: 15 G/DL — SIGNIFICANT CHANGE UP (ref 14–18)
IMM GRANULOCYTES NFR BLD AUTO: 0.3 % — SIGNIFICANT CHANGE UP (ref 0.1–0.3)
LYMPHOCYTES # BLD AUTO: 2.64 K/UL — SIGNIFICANT CHANGE UP (ref 1.2–3.4)
LYMPHOCYTES # BLD AUTO: 35.8 % — SIGNIFICANT CHANGE UP (ref 20.5–51.1)
MAGNESIUM SERPL-MCNC: 1.9 MG/DL — SIGNIFICANT CHANGE UP (ref 1.8–2.4)
MCHC RBC-ENTMCNC: 30.7 PG — SIGNIFICANT CHANGE UP (ref 27–31)
MCHC RBC-ENTMCNC: 34.8 G/DL — SIGNIFICANT CHANGE UP (ref 32–37)
MCV RBC AUTO: 88.1 FL — SIGNIFICANT CHANGE UP (ref 80–94)
MONOCYTES # BLD AUTO: 0.86 K/UL — HIGH (ref 0.1–0.6)
MONOCYTES NFR BLD AUTO: 11.7 % — HIGH (ref 1.7–9.3)
NEUTROPHILS # BLD AUTO: 3.37 K/UL — SIGNIFICANT CHANGE UP (ref 1.4–6.5)
NEUTROPHILS NFR BLD AUTO: 45.6 % — SIGNIFICANT CHANGE UP (ref 42.2–75.2)
NRBC # BLD: 0 /100 WBCS — SIGNIFICANT CHANGE UP (ref 0–0)
PHOSPHATE SERPL-MCNC: 2.1 MG/DL — SIGNIFICANT CHANGE UP (ref 2.1–4.9)
PLATELET # BLD AUTO: 216 K/UL — SIGNIFICANT CHANGE UP (ref 130–400)
POTASSIUM SERPL-MCNC: 4.3 MMOL/L — SIGNIFICANT CHANGE UP (ref 3.5–5)
POTASSIUM SERPL-SCNC: 4.3 MMOL/L — SIGNIFICANT CHANGE UP (ref 3.5–5)
PROT SERPL-MCNC: 6.3 G/DL — SIGNIFICANT CHANGE UP (ref 6–8)
RBC # BLD: 4.89 M/UL — SIGNIFICANT CHANGE UP (ref 4.7–6.1)
RBC # FLD: 11.8 % — SIGNIFICANT CHANGE UP (ref 11.5–14.5)
SODIUM SERPL-SCNC: 138 MMOL/L — SIGNIFICANT CHANGE UP (ref 135–146)
WBC # BLD: 7.38 K/UL — SIGNIFICANT CHANGE UP (ref 4.8–10.8)
WBC # FLD AUTO: 7.38 K/UL — SIGNIFICANT CHANGE UP (ref 4.8–10.8)

## 2018-09-30 RX ORDER — SODIUM CHLORIDE 9 MG/ML
1000 INJECTION INTRAMUSCULAR; INTRAVENOUS; SUBCUTANEOUS
Qty: 0 | Refills: 0 | Status: DISCONTINUED | OUTPATIENT
Start: 2018-10-01 | End: 2018-10-01

## 2018-09-30 RX ADMIN — AMPICILLIN SODIUM AND SULBACTAM SODIUM 200 GRAM(S): 250; 125 INJECTION, POWDER, FOR SUSPENSION INTRAMUSCULAR; INTRAVENOUS at 23:01

## 2018-09-30 RX ADMIN — HEPARIN SODIUM 5000 UNIT(S): 5000 INJECTION INTRAVENOUS; SUBCUTANEOUS at 06:29

## 2018-09-30 RX ADMIN — AMPICILLIN SODIUM AND SULBACTAM SODIUM 200 GRAM(S): 250; 125 INJECTION, POWDER, FOR SUSPENSION INTRAMUSCULAR; INTRAVENOUS at 06:27

## 2018-09-30 RX ADMIN — AMPICILLIN SODIUM AND SULBACTAM SODIUM 200 GRAM(S): 250; 125 INJECTION, POWDER, FOR SUSPENSION INTRAMUSCULAR; INTRAVENOUS at 18:09

## 2018-09-30 RX ADMIN — AMPICILLIN SODIUM AND SULBACTAM SODIUM 200 GRAM(S): 250; 125 INJECTION, POWDER, FOR SUSPENSION INTRAMUSCULAR; INTRAVENOUS at 11:14

## 2018-09-30 NOTE — PROGRESS NOTE ADULT - SUBJECTIVE AND OBJECTIVE BOX
GENERAL SURGERY PROGRESS NOTE     DONYA RAMIREZ  [address] [place] [address] day :3d  Surgical Attending: Eugenio Luevano  Overnight events: No acute events overnight. MRCP performed yesterday demonstrated gallstones in the neck of the gall bladder, without stones in the CBD.     T(F): 99.1 (09-29-18 @ 19:57), Max: 99.6 (09-29-18 @ 05:28)  HR: 98 (09-29-18 @ 19:57) (94 - 104)  BP: 120/58 (09-29-18 @ 19:57) (120/58 - 139/91)  ABP: --  ABP(mean): --  RR: 18 (09-29-18 @ 19:57) (18 - 20)  SpO2: --      09-28-18 @ 07:01  -  09-29-18 @ 07:00  --------------------------------------------------------  IN:    lactated ringers.: 2000 mL  Total IN: 2000 mL    OUT:  Total OUT: 0 mL    Total NET: 2000 mL        DIET/FLUIDS: sodium chloride 0.9%. 1000 milliLiter(s) IV Continuous <Continuous>       GI proph:    AC/ proph: heparin  Injectable 5000 Unit(s) SubCutaneous every 8 hours    ABx: ampicillin/sulbactam  IVPB 3 Gram(s) IV Intermittent every 6 hours      PHYSICAL EXAM:  GENERAL: NAD, well-appearing  CHEST/LUNG: Clear to auscultation bilaterally  HEART: Regular rate and rhythm  ABDOMEN: Soft, mildly tender, nondistended;   EXTREMITIES:  No clubbing, cyanosis, or edema      LABS  Labs:  CAPILLARY BLOOD GLUCOSE                              15.0   7.38  )-----------( 216      ( 30 Sep 2018 02:05 )             43.1       Auto Neutrophil %: 45.6 % (09-30-18 @ 02:05)  Auto Immature Granulocyte %: 0.3 % (09-30-18 @ 02:05)    09-29    138  |  102  |  14  ----------------------------<  114<H>  4.8   |  24  |  1.0          LFTs:             6.0  | 2.3  | 34       ------------------[76      ( 29 Sep 2018 01:35 )  3.7  | 0.4  | 50          Lipase:x      Amylase:x         Lactate, Blood: 1.2 mmol/L (09-28-18 @ 19:12)  Blood Gas Venous - Lactate: 2.1 mmoL/L (09-28-18 @ 05:08)  Blood Gas Venous - Lactate: 2.9 mmoL/L (09-28-18 @ 03:18)  Lactate, Blood: 3.4 mmol/L (09-28-18 @ 02:17)      Coags:

## 2018-09-30 NOTE — PROGRESS NOTE ADULT - ASSESSMENT
A/P:  DONYA RODRIGUEZMOHIT is a 53yMale HD3 with acute cholecystitis with concern for choledocholithiasis.     Plan:   -f/u w GI for ERCP  -plan for OR post-ERCP  -continue to trend LFTs

## 2018-10-01 ENCOUNTER — TRANSCRIPTION ENCOUNTER (OUTPATIENT)
Age: 53
End: 2018-10-01

## 2018-10-01 VITALS
TEMPERATURE: 97 F | DIASTOLIC BLOOD PRESSURE: 86 MMHG | SYSTOLIC BLOOD PRESSURE: 134 MMHG | RESPIRATION RATE: 16 BRPM | HEART RATE: 87 BPM

## 2018-10-01 LAB
ALBUMIN SERPL ELPH-MCNC: 3.7 G/DL — SIGNIFICANT CHANGE UP (ref 3.5–5.2)
ALP SERPL-CCNC: 73 U/L — SIGNIFICANT CHANGE UP (ref 30–115)
ALT FLD-CCNC: 42 U/L — HIGH (ref 0–41)
ANION GAP SERPL CALC-SCNC: 14 MMOL/L — SIGNIFICANT CHANGE UP (ref 7–14)
AST SERPL-CCNC: 24 U/L — SIGNIFICANT CHANGE UP (ref 0–41)
BASOPHILS # BLD AUTO: 0.02 K/UL — SIGNIFICANT CHANGE UP (ref 0–0.2)
BASOPHILS NFR BLD AUTO: 0.3 % — SIGNIFICANT CHANGE UP (ref 0–1)
BILIRUB DIRECT SERPL-MCNC: 0.3 MG/DL — HIGH (ref 0–0.2)
BILIRUB INDIRECT FLD-MCNC: 1.2 MG/DL — SIGNIFICANT CHANGE UP (ref 0.2–1.2)
BILIRUB SERPL-MCNC: 1.5 MG/DL — HIGH (ref 0.2–1.2)
BUN SERPL-MCNC: 9 MG/DL — LOW (ref 10–20)
CALCIUM SERPL-MCNC: 8.7 MG/DL — SIGNIFICANT CHANGE UP (ref 8.5–10.1)
CHLORIDE SERPL-SCNC: 103 MMOL/L — SIGNIFICANT CHANGE UP (ref 98–110)
CO2 SERPL-SCNC: 22 MMOL/L — SIGNIFICANT CHANGE UP (ref 17–32)
CREAT SERPL-MCNC: 0.9 MG/DL — SIGNIFICANT CHANGE UP (ref 0.7–1.5)
EOSINOPHIL # BLD AUTO: 0.56 K/UL — SIGNIFICANT CHANGE UP (ref 0–0.7)
EOSINOPHIL NFR BLD AUTO: 8.2 % — HIGH (ref 0–8)
GLUCOSE SERPL-MCNC: 92 MG/DL — SIGNIFICANT CHANGE UP (ref 70–99)
HCT VFR BLD CALC: 43 % — SIGNIFICANT CHANGE UP (ref 42–52)
HGB BLD-MCNC: 15.1 G/DL — SIGNIFICANT CHANGE UP (ref 14–18)
IMM GRANULOCYTES NFR BLD AUTO: 0.4 % — HIGH (ref 0.1–0.3)
LYMPHOCYTES # BLD AUTO: 2.63 K/UL — SIGNIFICANT CHANGE UP (ref 1.2–3.4)
LYMPHOCYTES # BLD AUTO: 38.3 % — SIGNIFICANT CHANGE UP (ref 20.5–51.1)
MAGNESIUM SERPL-MCNC: 2 MG/DL — SIGNIFICANT CHANGE UP (ref 1.8–2.4)
MCHC RBC-ENTMCNC: 30.8 PG — SIGNIFICANT CHANGE UP (ref 27–31)
MCHC RBC-ENTMCNC: 35.1 G/DL — SIGNIFICANT CHANGE UP (ref 32–37)
MCV RBC AUTO: 87.6 FL — SIGNIFICANT CHANGE UP (ref 80–94)
MONOCYTES # BLD AUTO: 0.65 K/UL — HIGH (ref 0.1–0.6)
MONOCYTES NFR BLD AUTO: 9.5 % — HIGH (ref 1.7–9.3)
NEUTROPHILS # BLD AUTO: 2.97 K/UL — SIGNIFICANT CHANGE UP (ref 1.4–6.5)
NEUTROPHILS NFR BLD AUTO: 43.3 % — SIGNIFICANT CHANGE UP (ref 42.2–75.2)
NRBC # BLD: 0 /100 WBCS — SIGNIFICANT CHANGE UP (ref 0–0)
PHOSPHATE SERPL-MCNC: 2.9 MG/DL — SIGNIFICANT CHANGE UP (ref 2.1–4.9)
PLATELET # BLD AUTO: 255 K/UL — SIGNIFICANT CHANGE UP (ref 130–400)
POTASSIUM SERPL-MCNC: 4 MMOL/L — SIGNIFICANT CHANGE UP (ref 3.5–5)
POTASSIUM SERPL-SCNC: 4 MMOL/L — SIGNIFICANT CHANGE UP (ref 3.5–5)
PROT SERPL-MCNC: 6.3 G/DL — SIGNIFICANT CHANGE UP (ref 6–8)
RBC # BLD: 4.91 M/UL — SIGNIFICANT CHANGE UP (ref 4.7–6.1)
RBC # FLD: 11.8 % — SIGNIFICANT CHANGE UP (ref 11.5–14.5)
SODIUM SERPL-SCNC: 139 MMOL/L — SIGNIFICANT CHANGE UP (ref 135–146)
WBC # BLD: 6.86 K/UL — SIGNIFICANT CHANGE UP (ref 4.8–10.8)
WBC # FLD AUTO: 6.86 K/UL — SIGNIFICANT CHANGE UP (ref 4.8–10.8)

## 2018-10-01 RX ORDER — SODIUM CHLORIDE 9 MG/ML
1000 INJECTION, SOLUTION INTRAVENOUS
Qty: 0 | Refills: 0 | Status: DISCONTINUED | OUTPATIENT
Start: 2018-10-01 | End: 2018-10-01

## 2018-10-01 RX ORDER — ACETAMINOPHEN 500 MG
2 TABLET ORAL
Qty: 0 | Refills: 0 | COMMUNITY
Start: 2018-10-01

## 2018-10-01 RX ORDER — IBUPROFEN 200 MG
1 TABLET ORAL
Qty: 0 | Refills: 0 | COMMUNITY
Start: 2018-10-01

## 2018-10-01 RX ADMIN — SODIUM CHLORIDE 100 MILLILITER(S): 9 INJECTION INTRAMUSCULAR; INTRAVENOUS; SUBCUTANEOUS at 00:02

## 2018-10-01 RX ADMIN — AMPICILLIN SODIUM AND SULBACTAM SODIUM 200 GRAM(S): 250; 125 INJECTION, POWDER, FOR SUSPENSION INTRAMUSCULAR; INTRAVENOUS at 11:27

## 2018-10-01 RX ADMIN — AMPICILLIN SODIUM AND SULBACTAM SODIUM 200 GRAM(S): 250; 125 INJECTION, POWDER, FOR SUSPENSION INTRAMUSCULAR; INTRAVENOUS at 05:36

## 2018-10-01 RX ADMIN — SODIUM CHLORIDE 75 MILLILITER(S): 9 INJECTION, SOLUTION INTRAVENOUS at 11:27

## 2018-10-01 NOTE — DISCHARGE NOTE ADULT - CARE PROVIDER_API CALL
Gustavo Massey), Gastroenterology; Internal Medicine  [address] [place] [address]  Phone: [phone]  Fax: [phone]    Eugenio Luevano), Surgery  [address] [place] [address]  Phone: [phone]  Fax: [phone]

## 2018-10-01 NOTE — PROGRESS NOTE ADULT - REASON FOR ADMISSION
Acute choledocholithiasis

## 2018-10-01 NOTE — DISCHARGE NOTE ADULT - PATIENT PORTAL LINK FT
You can access the [facility] Patient Portal, offered by [facility], by registering with the following website: [email or web address]

## 2018-10-01 NOTE — PROGRESS NOTE ADULT - ASSESSMENT
Patient is a 52 y/o Male with no PMH/PSH  that presented with acute onset of epigastric pain. Patient found to have Hydropic Gallbladder as well as ductal dilation. Surgery was not done over weekend despite stone visualized at Gallbladder neck. Patient feels well and abdominal pain improved. MRCP awaiting official read.    Cholelithiasis/ Biliary Colic  - Patient NPO  - Hold AC  - Await final read of MRCP  - Patient awaiting surgery  - Will follow

## 2018-10-01 NOTE — PROGRESS NOTE ADULT - SUBJECTIVE AND OBJECTIVE BOX
Patient is a 52 y/o Male with no PMH/PSH  that presented with acute onset of epigastric pain. Patient found to have Hydropic Gallbladder as well as ductal dilation. Surgery was not done over weekend despite stone visualized at Gallbladder neck. Patient feels well and abdominal pain improved.  Patient denies any current fever, chills, headache, dizziness, change in vision/hearing/speech, cough, SOB, chest pain, palpitations, abdominal pain, nausea/emesis, flank pain, burning on urination, swelling of lower extremities, itchiness, or change in skin color.    Vital Signs:  T(F): 98   HR: 80  BP: 131/82  RR: 18  Physical Exam  Gen: NAD  HEENT: NC/AT  Cardio: S1/S2 Regular  Resp: CTA B/L  Abdomen: Soft, ND/NT  Neuro: AAOx3, Cranial Nerve II-XII intact   Extremities: FROM x 4                          15.1   6.86  )-----------( 255      ( 01 Oct 2018 01:31 )             43.0   10-01    139  |  103  |  9<L>  ----------------------------<  92  4.0   |  22  |  0.9    Ca    8.7      01 Oct 2018 01:31  Phos  2.9     10-01  Mg     2.0     10-01    TPro  6.3  /  Alb  3.7  /  TBili  1.5<H>  /  DBili  0.3<H>  /  AST  24  /  ALT  42<H>  /  AlkPhos  73  10-01

## 2018-10-01 NOTE — PROGRESS NOTE ADULT - SUBJECTIVE AND OBJECTIVE BOX
GENERAL SURGERY PROGRESS NOTE     LLUVIAKINESY DONYA  [address] [place] [address] day :3d  Surgical Attending: Eugenio Luevano  Overnight events: No acute events overnight. Patient comfortable at bedside. Pain improving. MRCP with no stones in CBD.    T(F): 98 (10-01-18 @ 05:23), Max: 98 (09-30-18 @ 20:10)  HR: 80 (10-01-18 @ 05:23) (80 - 85)  BP: 131/82 (10-01-18 @ 05:23) (127/77 - 138/84)  ABP: --  ABP(mean): --  RR: 18 (10-01-18 @ 05:23) (18 - 18)  SpO2: --      DIET/FLUIDS: sodium chloride 0.9%. 1000 milliLiter(s) IV Continuous <Continuous>    NG:                                                                              DRAINS:   BM:   EMESIS:   URINE:    GI proph:    AC/ proph: heparin  Injectable 5000 Unit(s) SubCutaneous every 8 hours    ABx: ampicillin/sulbactam  IVPB 3 Gram(s) IV Intermittent every 6 hours      PHYSICAL EXAM:  GENERAL: NAD, well-appearing  CHEST/LUNG: Clear to auscultation bilaterally  HEART: Regular rate and rhythm  ABDOMEN: Soft, nontender, nondistended;         LABS  Labs:  CAPILLARY BLOOD GLUCOSE                              15.1   6.86  )-----------( 255      ( 01 Oct 2018 01:31 )             43.0       Auto Neutrophil %: 43.3 % (10-01-18 @ 01:31)  Auto Immature Granulocyte %: 0.4 % (10-01-18 @ 01:31)    10-01    139  |  103  |  9<L>  ----------------------------<  92  4.0   |  22  |  0.9      Calcium, Total Serum: 8.7 mg/dL (10-01-18 @ 01:31)      LFTs:             6.3  | 1.5  | 24       ------------------[73      ( 01 Oct 2018 01:31 )  3.7  | 0.3  | 42          Lipase:x      Amylase:x         Lactate, Blood: 1.2 mmol/L (09-28-18 @ 19:12)      Coags:

## 2018-10-01 NOTE — DISCHARGE NOTE ADULT - HOSPITAL COURSE
53y Male w/ no PMH/PSH presents with acute onset of epigastric pain around 9pm last night. Hx obtained from pt and son who is fluent in english. Patient states he was the pain is associated with mild nausea. No vom. Denies having this type of pain before. Non-radiating. Denies fevers/chills, sob, chest pain, diarrhea/constipation, hematuria, dysuria, back pain, previous abdominal surgeries. ultrasound showed :1.  Cholelithiasis without sonographic evidence for cholecystitis. Dilated common bile duct measuring 1.1 cm;  correlation with LFTs is suggested. The patients LFTs were elevated, MRCP was performed. MRCP showed Cholelithiasis with CBD measuring upper limits of normal without evidence of choledocholithiasis. The patients LFTs were elevated after the MRCP but are now trending down. The patients pain has resolved, he was advanced to a regular diet, he is ambulating. The patient will go home with instructions to follow up with Dr. Luevano and Dr. Massey as an outpatient.

## 2018-10-01 NOTE — DISCHARGE NOTE ADULT - MEDICATION SUMMARY - MEDICATIONS TO TAKE
I will START or STAY ON the medications listed below when I get home from the hospital:    acetaminophen 325 mg oral tablet  -- 2 tab(s) by mouth every 6 hours, As needed, Mild Pain (1 - 3)  -- Indication: For prn pain    ibuprofen 400 mg oral tablet  -- 1 tab(s) by mouth every 8 hours, As needed, Moderate Pain (4 - 6)  -- Indication: For prn pain

## 2018-10-01 NOTE — DISCHARGE NOTE ADULT - PLAN OF CARE
Needs further management Continue regular diet.  Please call to schedule a follow up appointment with Dr. Luevano on Thursday 10/4/18, the phone number is [phone].  Please follow up with GI, call to schedule an appointment with Dr. Massey, [phone]

## 2018-10-01 NOTE — DISCHARGE NOTE ADULT - ADDITIONAL INSTRUCTIONS
please schedule follow up appointments with :  1. Dr. Luevano on 10/4/18, please call to schedule the appointment [phone]  2. Dr. aMssey (Gastroenterologist), Please call to schedule the appointment [phone]

## 2018-10-01 NOTE — DISCHARGE NOTE ADULT - CARE PROVIDERS DIRECT ADDRESSES
,DirectAddress_Unknown,[email or web address]

## 2018-10-01 NOTE — DISCHARGE NOTE ADULT - CARE PLAN
Principal Discharge DX:	Choledocholithiasis  Goal:	Needs further management  Assessment and plan of treatment:	Continue regular diet.  Please call to schedule a follow up appointment with Dr. Luevano on Thursday 10/4/18, the phone number is [phone].  Please follow up with GI, call to schedule an appointment with Dr. Massey, [phone]

## 2018-10-02 ENCOUNTER — CHART COPY (OUTPATIENT)
Age: 53
End: 2018-10-02

## 2018-10-02 PROBLEM — Z00.00 ENCOUNTER FOR PREVENTIVE HEALTH EXAMINATION: Status: ACTIVE | Noted: 2018-10-02

## 2018-10-04 ENCOUNTER — APPOINTMENT (OUTPATIENT)
Dept: SURGERY | Facility: CLINIC | Age: 53
End: 2018-10-04
Payer: COMMERCIAL

## 2018-10-04 VITALS
BODY MASS INDEX: 33.24 KG/M2 | WEIGHT: 259 LBS | DIASTOLIC BLOOD PRESSURE: 76 MMHG | HEIGHT: 74 IN | SYSTOLIC BLOOD PRESSURE: 120 MMHG

## 2018-10-04 PROCEDURE — 99212 OFFICE O/P EST SF 10 MIN: CPT

## 2018-10-11 DIAGNOSIS — K83.8 OTHER SPECIFIED DISEASES OF BILIARY TRACT: ICD-10-CM

## 2018-10-11 DIAGNOSIS — K82.1 HYDROPS OF GALLBLADDER: ICD-10-CM

## 2018-10-11 DIAGNOSIS — K80.20 CALCULUS OF GALLBLADDER WITHOUT CHOLECYSTITIS WITHOUT OBSTRUCTION: ICD-10-CM

## 2018-10-19 ENCOUNTER — APPOINTMENT (OUTPATIENT)
Dept: SURGERY | Facility: CLINIC | Age: 53
End: 2018-10-19
Payer: COMMERCIAL

## 2018-10-19 VITALS
WEIGHT: 261 LBS | HEIGHT: 74 IN | DIASTOLIC BLOOD PRESSURE: 78 MMHG | BODY MASS INDEX: 33.5 KG/M2 | SYSTOLIC BLOOD PRESSURE: 132 MMHG

## 2018-10-19 DIAGNOSIS — K80.20 CALCULUS OF GALLBLADDER W/OUT CHOLECYSTITIS W/OUT OBSTRUCTION: ICD-10-CM

## 2018-10-19 DIAGNOSIS — K83.9 DISEASE OF BILIARY TRACT, UNSPECIFIED: ICD-10-CM

## 2018-10-19 DIAGNOSIS — K83.8 OTHER SPECIFIED DISEASES OF BILIARY TRACT: ICD-10-CM

## 2018-10-19 PROCEDURE — 99214 OFFICE O/P EST MOD 30 MIN: CPT

## 2018-10-30 ENCOUNTER — OUTPATIENT (OUTPATIENT)
Dept: OUTPATIENT SERVICES | Facility: HOSPITAL | Age: 53
LOS: 1 days | Discharge: HOME | End: 2018-10-30

## 2018-10-30 VITALS
TEMPERATURE: 98 F | DIASTOLIC BLOOD PRESSURE: 90 MMHG | SYSTOLIC BLOOD PRESSURE: 136 MMHG | RESPIRATION RATE: 15 BRPM | HEART RATE: 80 BPM | HEIGHT: 74 IN | WEIGHT: 260.81 LBS | OXYGEN SATURATION: 96 %

## 2018-10-30 DIAGNOSIS — K82.9 DISEASE OF GALLBLADDER, UNSPECIFIED: ICD-10-CM

## 2018-10-30 DIAGNOSIS — Z01.818 ENCOUNTER FOR OTHER PREPROCEDURAL EXAMINATION: ICD-10-CM

## 2018-10-30 DIAGNOSIS — Z98.890 OTHER SPECIFIED POSTPROCEDURAL STATES: Chronic | ICD-10-CM

## 2018-10-30 DIAGNOSIS — K80.20 CALCULUS OF GALLBLADDER WITHOUT CHOLECYSTITIS WITHOUT OBSTRUCTION: ICD-10-CM

## 2018-10-30 DIAGNOSIS — E66.9 OBESITY, UNSPECIFIED: ICD-10-CM

## 2018-10-30 LAB
ALBUMIN SERPL ELPH-MCNC: 4.4 G/DL — SIGNIFICANT CHANGE UP (ref 3.5–5.2)
ALP SERPL-CCNC: 115 U/L — SIGNIFICANT CHANGE UP (ref 30–115)
ALT FLD-CCNC: 61 U/L — HIGH (ref 0–41)
ANION GAP SERPL CALC-SCNC: 13 MMOL/L — SIGNIFICANT CHANGE UP (ref 7–14)
APTT BLD: 29.9 SEC — SIGNIFICANT CHANGE UP (ref 27–39.2)
AST SERPL-CCNC: 28 U/L — SIGNIFICANT CHANGE UP (ref 0–41)
BILIRUB SERPL-MCNC: 1.4 MG/DL — HIGH (ref 0.2–1.2)
BUN SERPL-MCNC: 13 MG/DL — SIGNIFICANT CHANGE UP (ref 10–20)
CALCIUM SERPL-MCNC: 9.2 MG/DL — SIGNIFICANT CHANGE UP (ref 8.5–10.1)
CHLORIDE SERPL-SCNC: 105 MMOL/L — SIGNIFICANT CHANGE UP (ref 98–110)
CO2 SERPL-SCNC: 24 MMOL/L — SIGNIFICANT CHANGE UP (ref 17–32)
CREAT SERPL-MCNC: 0.9 MG/DL — SIGNIFICANT CHANGE UP (ref 0.7–1.5)
GLUCOSE SERPL-MCNC: 117 MG/DL — HIGH (ref 70–99)
INR BLD: 0.96 RATIO — SIGNIFICANT CHANGE UP (ref 0.65–1.3)
POTASSIUM SERPL-MCNC: 4.7 MMOL/L — SIGNIFICANT CHANGE UP (ref 3.5–5)
POTASSIUM SERPL-SCNC: 4.7 MMOL/L — SIGNIFICANT CHANGE UP (ref 3.5–5)
PROT SERPL-MCNC: 7.3 G/DL — SIGNIFICANT CHANGE UP (ref 6–8)
PROTHROM AB SERPL-ACNC: 11.1 SEC — SIGNIFICANT CHANGE UP (ref 9.95–12.87)
SODIUM SERPL-SCNC: 142 MMOL/L — SIGNIFICANT CHANGE UP (ref 135–146)

## 2018-10-30 NOTE — H&P PST ADULT - HISTORY OF PRESENT ILLNESS
53 year old male here for gallbladder removal, had pain 1 month ago came to ed ,"did some test dot know what its called" and found stones  fos= 2-3  denies chest pain sob palp  denies recent uri or uti

## 2018-11-09 PROBLEM — K82.9 DISEASE OF GALLBLADDER, UNSPECIFIED: Chronic | Status: ACTIVE | Noted: 2018-10-30

## 2018-11-09 PROBLEM — E66.9 OBESITY, UNSPECIFIED: Chronic | Status: ACTIVE | Noted: 2018-10-30

## 2018-11-13 ENCOUNTER — RESULT REVIEW (OUTPATIENT)
Age: 53
End: 2018-11-13

## 2018-11-13 ENCOUNTER — OUTPATIENT (OUTPATIENT)
Dept: OUTPATIENT SERVICES | Facility: HOSPITAL | Age: 53
LOS: 1 days | Discharge: HOME | End: 2018-11-13
Payer: COMMERCIAL

## 2018-11-13 ENCOUNTER — APPOINTMENT (OUTPATIENT)
Dept: SURGERY | Facility: HOSPITAL | Age: 53
End: 2018-11-13

## 2018-11-13 VITALS
WEIGHT: 259.93 LBS | RESPIRATION RATE: 18 BRPM | HEART RATE: 100 BPM | TEMPERATURE: 98 F | SYSTOLIC BLOOD PRESSURE: 150 MMHG | DIASTOLIC BLOOD PRESSURE: 100 MMHG | HEIGHT: 74 IN

## 2018-11-13 VITALS — DIASTOLIC BLOOD PRESSURE: 95 MMHG | HEART RATE: 91 BPM | RESPIRATION RATE: 18 BRPM | SYSTOLIC BLOOD PRESSURE: 153 MMHG

## 2018-11-13 DIAGNOSIS — Z98.890 OTHER SPECIFIED POSTPROCEDURAL STATES: Chronic | ICD-10-CM

## 2018-11-13 PROCEDURE — 47563 LAPARO CHOLECYSTECTOMY/GRAPH: CPT | Mod: 22

## 2018-11-13 RX ORDER — SODIUM CHLORIDE 9 MG/ML
1000 INJECTION, SOLUTION INTRAVENOUS
Qty: 0 | Refills: 0 | Status: DISCONTINUED | OUTPATIENT
Start: 2018-11-13 | End: 2018-11-28

## 2018-11-13 RX ORDER — ONDANSETRON 8 MG/1
4 TABLET, FILM COATED ORAL ONCE
Qty: 0 | Refills: 0 | Status: DISCONTINUED | OUTPATIENT
Start: 2018-11-13 | End: 2018-11-28

## 2018-11-13 RX ORDER — CELECOXIB 200 MG/1
200 CAPSULE ORAL ONCE
Qty: 0 | Refills: 0 | Status: DISCONTINUED | OUTPATIENT
Start: 2018-11-13 | End: 2018-11-13

## 2018-11-13 RX ORDER — ACETAMINOPHEN 500 MG
650 TABLET ORAL ONCE
Qty: 0 | Refills: 0 | Status: COMPLETED | OUTPATIENT
Start: 2018-11-13 | End: 2018-11-13

## 2018-11-13 RX ORDER — MORPHINE SULFATE 50 MG/1
2 CAPSULE, EXTENDED RELEASE ORAL
Qty: 0 | Refills: 0 | Status: DISCONTINUED | OUTPATIENT
Start: 2018-11-13 | End: 2018-11-13

## 2018-11-13 RX ORDER — HYDROMORPHONE HYDROCHLORIDE 2 MG/ML
0.5 INJECTION INTRAMUSCULAR; INTRAVENOUS; SUBCUTANEOUS
Qty: 0 | Refills: 0 | Status: DISCONTINUED | OUTPATIENT
Start: 2018-11-13 | End: 2018-11-13

## 2018-11-13 RX ADMIN — SODIUM CHLORIDE 100 MILLILITER(S): 9 INJECTION, SOLUTION INTRAVENOUS at 16:10

## 2018-11-13 RX ADMIN — Medication 650 MILLIGRAM(S): at 17:32

## 2018-11-13 NOTE — BRIEF OPERATIVE NOTE - PRE-OP DX
Cholecystitis, chronic  11/13/2018  Chronic cholecystitis. Mirizzi syndrome.  Active  Anthony Zimmerman

## 2018-11-13 NOTE — CHART NOTE - NSCHARTNOTEFT_GEN_A_CORE
PACU ANESTHESIA ADMISSION NOTE      Procedure: Laparoscopic cholecystectomy: Laparoscopic cholecystectomy. Intraoperative cholangiogram.    Post op diagnosis:  Chronic cholecystitis      ____  Intubated  TV:______       Rate: ______      FiO2: ______    __x__  Patent Airway    __x__  Full return of protective reflexes    __x__  Full recovery from anesthesia / back to baseline     Vitals:   T:   97.5F        R:     16             BP: 157/92                 Sat:   98                P: 16      Mental Status:  __x__ Awake   __x___ Alert   _____ Drowsy   _____ Sedated    Nausea/Vomiting:  __x__ NO  ______Yes,   See Post - Op Orders          Pain Scale (0-10):  __0/10___    Treatment: ____ None    __x__ See Post - Op/PCA Orders    Post - Operative Fluids:   ____ Oral   __x__ See Post - Op Orders    Plan: Discharge:   __x__Home       _____Floor     _____Critical Care    _____  Other:_________________    Comments: Pt tolerated procedure well, no anesthesia related complications. Care of pt endorsed to PACU, report given to PACU RN. Please discharge when criteria are met.

## 2018-11-13 NOTE — BRIEF OPERATIVE NOTE - PROCEDURE
<<-----Click on this checkbox to enter Procedure Laparoscopic cholecystectomy  11/13/2018  Laparoscopic cholecystectomy. Intraoperative cholangiogram.  Active  YOHANA Cholecystectomy with cholangiography  11/13/2018    Active  TRISH  Diagnostic laparoscopy  11/13/2018    Active  TRISH  Enterolysis, laparoscopic  11/13/2018    Active  IMPOPEYE  Block, transversus abdominis plane  11/13/2018    Active  TRISH

## 2018-11-13 NOTE — BRIEF OPERATIVE NOTE - POST-OP DX
Chronic cholecystitis  11/13/2018  Chronic cholecystitis. Mirizzi syndrome.  Active  Anthony Zimmerman

## 2018-11-16 LAB — SURGICAL PATHOLOGY STUDY: SIGNIFICANT CHANGE UP

## 2018-11-19 DIAGNOSIS — K80.20 CALCULUS OF GALLBLADDER WITHOUT CHOLECYSTITIS WITHOUT OBSTRUCTION: ICD-10-CM

## 2018-11-19 DIAGNOSIS — K80.10 CALCULUS OF GALLBLADDER WITH CHRONIC CHOLECYSTITIS WITHOUT OBSTRUCTION: ICD-10-CM

## 2018-11-19 DIAGNOSIS — E66.9 OBESITY, UNSPECIFIED: ICD-10-CM

## 2018-11-19 DIAGNOSIS — F10.10 ALCOHOL ABUSE, UNCOMPLICATED: ICD-10-CM

## 2018-11-19 DIAGNOSIS — K82.8 OTHER SPECIFIED DISEASES OF GALLBLADDER: ICD-10-CM

## 2018-11-27 NOTE — ASU PATIENT PROFILE, ADULT - TEACHING/LEARNING CULTURAL CONSIDERATIONS
none Paramedian Forehead Flap Text: A decision was made to reconstruct the defect utilizing an interpolation axial flap and a staged reconstruction.  A telfa template was made of the defect.  This telfa template was then used to outline the paramedian forehead pedicle flap.  The donor area for the pedicle flap was then injected with anesthesia.  The flap was excised through the skin and subcutaneous tissue down to the layer of the underlying musculature.  The pedicle flap was carefully excised within this deep plane to maintain its blood supply.  The edges of the donor site were undermined.   The donor site was closed in a primary fashion.  The pedicle was then rotated into position and sutured.  Once the tube was sutured into place, adequate blood supply was confirmed with blanching and refill.  The pedicle was then wrapped with xeroform gauze and dressed appropriately with a telfa and gauze bandage to ensure continued blood supply and protect the attached pedicle.

## 2018-11-28 ENCOUNTER — APPOINTMENT (OUTPATIENT)
Dept: SURGERY | Facility: CLINIC | Age: 53
End: 2018-11-28
Payer: COMMERCIAL

## 2018-11-28 ENCOUNTER — APPOINTMENT (OUTPATIENT)
Dept: SURGERY | Facility: CLINIC | Age: 53
End: 2018-11-28

## 2018-11-28 VITALS
BODY MASS INDEX: 32.98 KG/M2 | HEIGHT: 74 IN | WEIGHT: 257 LBS | SYSTOLIC BLOOD PRESSURE: 138 MMHG | DIASTOLIC BLOOD PRESSURE: 84 MMHG

## 2018-11-28 PROCEDURE — 99024 POSTOP FOLLOW-UP VISIT: CPT

## 2019-02-13 NOTE — ED ADULT TRIAGE NOTE - PAIN RATING/NUMBER SCALE (0-10): REST
10 H Plasty Text: Given the location of the defect, shape of the defect and the proximity to free margins a H-plasty was deemed most appropriate for repair.  Using a sterile surgical marker, the appropriate advancement arms of the H-plasty were drawn incorporating the defect and placing the expected incisions within the relaxed skin tension lines where possible. The area thus outlined was incised deep to adipose tissue with a #15 scalpel blade. The skin margins were undermined to an appropriate distance in all directions utilizing iris scissors.  The opposing advancement arms were then advanced into place in opposite direction and anchored with interrupted buried subcutaneous sutures.

## 2019-03-12 NOTE — PROGRESS NOTE ADULT - ASSESSMENT
A/P:  DONYA RAMIREZ is a 53yMale HD4 with acute cholecystitis, MRCP with no stones in CBD    Plan:   -LFTs improving, however NPO for possible ERCP today  -IVF  -f/u GI  -encourage ambulation, IS STEPHAN Chan RN   [phone]

## 2019-06-01 ENCOUNTER — OUTPATIENT (OUTPATIENT)
Dept: OUTPATIENT SERVICES | Facility: HOSPITAL | Age: 54
LOS: 1 days | Discharge: HOME | End: 2019-06-01
Payer: COMMERCIAL

## 2019-06-01 DIAGNOSIS — Z98.890 OTHER SPECIFIED POSTPROCEDURAL STATES: Chronic | ICD-10-CM

## 2019-06-01 DIAGNOSIS — R05 COUGH: ICD-10-CM

## 2019-06-01 PROCEDURE — 71046 X-RAY EXAM CHEST 2 VIEWS: CPT | Mod: 26

## 2019-10-28 ENCOUNTER — APPOINTMENT (OUTPATIENT)
Dept: CARDIOLOGY | Facility: CLINIC | Age: 54
End: 2019-10-28
Payer: COMMERCIAL

## 2019-10-28 PROCEDURE — 99204 OFFICE O/P NEW MOD 45 MIN: CPT

## 2019-10-28 PROCEDURE — 93000 ELECTROCARDIOGRAM COMPLETE: CPT

## 2019-11-06 ENCOUNTER — APPOINTMENT (OUTPATIENT)
Dept: CARDIOLOGY | Facility: CLINIC | Age: 54
End: 2019-11-06
Payer: COMMERCIAL

## 2019-11-06 PROCEDURE — 93015 CV STRESS TEST SUPVJ I&R: CPT

## 2020-02-11 ENCOUNTER — APPOINTMENT (OUTPATIENT)
Dept: CARDIOLOGY | Facility: CLINIC | Age: 55
End: 2020-02-11
Payer: COMMERCIAL

## 2020-02-11 PROCEDURE — 99213 OFFICE O/P EST LOW 20 MIN: CPT

## 2020-02-11 PROCEDURE — 93000 ELECTROCARDIOGRAM COMPLETE: CPT

## 2020-02-28 ENCOUNTER — TRANSCRIPTION ENCOUNTER (OUTPATIENT)
Age: 55
End: 2020-02-28

## 2020-08-06 ENCOUNTER — RECORD ABSTRACTING (OUTPATIENT)
Age: 55
End: 2020-08-06

## 2020-08-06 DIAGNOSIS — Z87.891 PERSONAL HISTORY OF NICOTINE DEPENDENCE: ICD-10-CM

## 2020-08-06 DIAGNOSIS — Z82.49 FAMILY HISTORY OF ISCHEMIC HEART DISEASE AND OTHER DISEASES OF THE CIRCULATORY SYSTEM: ICD-10-CM

## 2020-08-06 DIAGNOSIS — Z86.79 PERSONAL HISTORY OF OTHER DISEASES OF THE CIRCULATORY SYSTEM: ICD-10-CM

## 2020-08-06 DIAGNOSIS — Z78.9 OTHER SPECIFIED HEALTH STATUS: ICD-10-CM

## 2020-08-21 ENCOUNTER — APPOINTMENT (OUTPATIENT)
Dept: CARDIOLOGY | Facility: CLINIC | Age: 55
End: 2020-08-21
Payer: COMMERCIAL

## 2020-08-21 VITALS
WEIGHT: 280 LBS | DIASTOLIC BLOOD PRESSURE: 74 MMHG | HEIGHT: 73 IN | SYSTOLIC BLOOD PRESSURE: 116 MMHG | BODY MASS INDEX: 37.11 KG/M2

## 2020-08-21 PROCEDURE — 93000 ELECTROCARDIOGRAM COMPLETE: CPT

## 2020-08-21 PROCEDURE — 99213 OFFICE O/P EST LOW 20 MIN: CPT | Mod: 25

## 2020-08-21 RX ORDER — METOPROLOL TARTRATE 75 MG/1
TABLET, FILM COATED ORAL
Refills: 0 | Status: DISCONTINUED | COMMUNITY
End: 2020-08-21

## 2020-08-21 NOTE — REVIEW OF SYSTEMS
[see HPI] : see HPI [Negative] : Endocrine [Blurry Vision] : no blurred vision [Seeing Double (Diplopia)] : no diplopia [Lower Ext Edema] : no extremity edema [Leg Claudication] : no intermittent leg claudication [Palpitations] : no palpitations [Skin: A Rash] : no rash: [Anxiety] : no anxiety [Easy Bruising] : no tendency for easy bruising

## 2020-08-21 NOTE — DISCUSSION/SUMMARY
[FreeTextEntry1] : 55-yo male with h/o obesity, non-obstructive CAD, HTN, hyperlipidemia. No CP or SOB now.\par \par Plan:\par Diet, weight loss discussed again.\par Continue treatment.\par Blood work from PMD.\par F/u in 6 months.\par \par Ton Flor MD\par

## 2020-08-21 NOTE — HISTORY OF PRESENT ILLNESS
[FreeTextEntry1] : 55-yo male with h/o HTN, controlled now. Cardiac CTA in 2018 showed mild calcified plaque in proximal LAD, LVEF 53%. ECHO in May 2019 showed normal LVEF.\par \par Patient denies CP, SOB. He has gained weight since the beginning of the Covid pandemic.

## 2020-08-21 NOTE — PHYSICAL EXAM
[Normal Appearance] : normal appearance [General Appearance - Well Developed] : well developed [General Appearance - Well Nourished] : well nourished [No Deformities] : no deformities [Well Groomed] : well groomed [Normal Conjunctiva] : the conjunctiva exhibited no abnormalities [Eyelids - No Xanthelasma] : the eyelids demonstrated no xanthelasmas [General Appearance - In No Acute Distress] : no acute distress [Exaggerated Use Of Accessory Muscles For Inspiration] : no accessory muscle use [Respiration, Rhythm And Depth] : normal respiratory rhythm and effort [Auscultation Breath Sounds / Voice Sounds] : lungs were clear to auscultation bilaterally [Normal Rate] : normal [Rhythm Regular] : regular [Normal S2] : normal S2 [Normal S1] : normal S1 [2+] : right 2+ [No Murmur] : no murmurs heard [S4] : an S4 was heard [No Pitting Edema] : no pitting edema present [Abdomen Soft] : soft [Abdomen Tenderness] : non-tender [Bowel Sounds] : normal bowel sounds [Cyanosis, Localized] : no localized cyanosis [Skin Color & Pigmentation] : normal skin color and pigmentation [Abdomen Mass (___ Cm)] : no abdominal mass palpated [Affect] : the affect was normal [] : no rash [Oriented To Time, Place, And Person] : oriented to person, place, and time [Mood] : the mood was normal [S3] : no S3

## 2020-08-21 NOTE — ASSESSMENT
[FreeTextEntry1] : ECG: SR 90/min, no ST changes.\par \par 2D ECHO 05/18/2019 :\par Normal LVEF\par Impaired relaxation\par Mild MR, TR  \par

## 2020-12-16 NOTE — CONSULT NOTE ADULT - PROVIDER SPECIALTY LIST ADULT
Gastroenterology
Surgery
A-T Advancement Flap Text: The defect edges were debeveled with a #15 scalpel blade.  Given the location of the defect, shape of the defect and the proximity to free margins an A-T advancement flap was deemed most appropriate.  Using a sterile surgical marker, an appropriate advancement flap was drawn incorporating the defect and placing the expected incisions within the relaxed skin tension lines where possible.    The area thus outlined was incised deep to adipose tissue with a #15 scalpel blade.  The skin margins were undermined to an appropriate distance in all directions utilizing iris scissors.

## 2021-02-26 ENCOUNTER — APPOINTMENT (OUTPATIENT)
Dept: CARDIOLOGY | Facility: CLINIC | Age: 56
End: 2021-02-26
Payer: COMMERCIAL

## 2021-02-26 VITALS
TEMPERATURE: 98.5 F | BODY MASS INDEX: 34.65 KG/M2 | HEIGHT: 74 IN | SYSTOLIC BLOOD PRESSURE: 120 MMHG | WEIGHT: 270 LBS | DIASTOLIC BLOOD PRESSURE: 70 MMHG

## 2021-02-26 DIAGNOSIS — I10 ESSENTIAL (PRIMARY) HYPERTENSION: ICD-10-CM

## 2021-02-26 DIAGNOSIS — E66.9 OBESITY, UNSPECIFIED: ICD-10-CM

## 2021-02-26 PROCEDURE — 93000 ELECTROCARDIOGRAM COMPLETE: CPT

## 2021-02-26 PROCEDURE — 99072 ADDL SUPL MATRL&STAF TM PHE: CPT

## 2021-02-26 PROCEDURE — 99214 OFFICE O/P EST MOD 30 MIN: CPT

## 2021-02-26 RX ORDER — LOSARTAN POTASSIUM 50 MG/1
50 TABLET, FILM COATED ORAL
Qty: 90 | Refills: 1 | Status: DISCONTINUED | COMMUNITY
End: 2021-02-26

## 2021-02-26 RX ORDER — METOPROLOL SUCCINATE 50 MG/1
50 TABLET, EXTENDED RELEASE ORAL
Qty: 90 | Refills: 1 | Status: DISCONTINUED | COMMUNITY
Start: 2020-08-21 | End: 2021-02-26

## 2021-02-26 RX ORDER — ATORVASTATIN CALCIUM 10 MG/1
10 TABLET, FILM COATED ORAL
Refills: 0 | Status: DISCONTINUED | COMMUNITY
End: 2021-02-26

## 2021-02-26 NOTE — HISTORY OF PRESENT ILLNESS
[FreeTextEntry1] : 55-yo male with h/o HTN, hyperlipidemia. Cardiac CTA in 2018 showed mild calcified plaque in proximal LAD, LVEF 53%. ECHO in May 2019 showed normal LVEF.\par \par Patient denies CP, SOB. His wife and he have tried to reduce his salt intake. He ran out of his medications 1 month ago. His BP on a f/u visit to his PMD was normal.\par \par 2D ECHO 05/18/2019 :\par Normal LVEF\par Impaired relaxation\par Mild MR, TR.

## 2021-02-26 NOTE — PHYSICAL EXAM
[General Appearance - Well Developed] : well developed [Normal Appearance] : normal appearance [Well Groomed] : well groomed [General Appearance - Well Nourished] : well nourished [No Deformities] : no deformities [General Appearance - In No Acute Distress] : no acute distress [Normal Conjunctiva] : the conjunctiva exhibited no abnormalities [Eyelids - No Xanthelasma] : the eyelids demonstrated no xanthelasmas [Respiration, Rhythm And Depth] : normal respiratory rhythm and effort [Exaggerated Use Of Accessory Muscles For Inspiration] : no accessory muscle use [Auscultation Breath Sounds / Voice Sounds] : lungs were clear to auscultation bilaterally [Normal Rate] : normal [Rhythm Regular] : regular [Normal S1] : normal S1 [Normal S2] : normal S2 [S3] : no S3 [S4] : an S4 was heard [No Murmur] : no murmurs heard [2+] : left 2+ [No Pitting Edema] : no pitting edema present [Bowel Sounds] : normal bowel sounds [Abdomen Soft] : soft [Abdomen Tenderness] : non-tender [Abdomen Mass (___ Cm)] : no abdominal mass palpated [Cyanosis, Localized] : no localized cyanosis [Skin Color & Pigmentation] : normal skin color and pigmentation [] : no rash [Oriented To Time, Place, And Person] : oriented to person, place, and time [Affect] : the affect was normal [Mood] : the mood was normal

## 2021-05-19 NOTE — ASU PREOP CHECKLIST - ISOLATION PRECAUTIONS
Problem: Pain  Goal: #Acceptable pain level achieved/maintained at rest using NRS/Faces  Description: This goal is used for patients who can self-report.  Acceptable means the level is at or below the identified comfort/function goal.  Outcome: Outcome Met, Continue evaluating goal progress toward completion  Goal: # Acceptable pain level achieved/maintained with activity using NRS/Faces  Description: This goal is used for patients who can self-report and are not achieving acceptable pain control during activity.  Outcome: Outcome Met, Continue evaluating goal progress toward completion  Goal: # Verbalizes understanding of pain management  Description: Documented in Patient Education Activity  Outcome: Outcome Met, Continue evaluating goal progress toward completion     Problem: VTE, Risk for  Goal: # No s/s of VTE  Outcome: Outcome Met, Continue evaluating goal progress toward completion  Goal: # Verbalizes understanding of VTE risk factors and prevention  Description: Document education using the patient education activity.   Outcome: Outcome Met, Continue evaluating goal progress toward completion      none

## 2021-08-27 ENCOUNTER — APPOINTMENT (OUTPATIENT)
Dept: CARDIOLOGY | Facility: CLINIC | Age: 56
End: 2021-08-27
Payer: COMMERCIAL

## 2021-08-27 VITALS
SYSTOLIC BLOOD PRESSURE: 130 MMHG | DIASTOLIC BLOOD PRESSURE: 80 MMHG | HEIGHT: 74 IN | WEIGHT: 280 LBS | BODY MASS INDEX: 35.94 KG/M2

## 2021-08-27 DIAGNOSIS — E78.49 OTHER HYPERLIPIDEMIA: ICD-10-CM

## 2021-08-27 DIAGNOSIS — I25.10 ATHEROSCLEROTIC HEART DISEASE OF NATIVE CORONARY ARTERY W/OUT ANGINA PECTORIS: ICD-10-CM

## 2021-08-27 PROCEDURE — 99213 OFFICE O/P EST LOW 20 MIN: CPT

## 2021-08-27 PROCEDURE — 93000 ELECTROCARDIOGRAM COMPLETE: CPT

## 2021-08-27 RX ORDER — ATORVASTATIN CALCIUM 10 MG/1
10 TABLET, FILM COATED ORAL
Qty: 90 | Refills: 1 | Status: DISCONTINUED | COMMUNITY
Start: 2021-02-26 | End: 2021-08-27

## 2021-08-27 RX ORDER — ATORVASTATIN CALCIUM 10 MG/1
10 TABLET, FILM COATED ORAL
Qty: 90 | Refills: 1 | Status: ACTIVE | COMMUNITY
Start: 2021-08-27 | End: 1900-01-01

## 2021-08-29 RX ORDER — ERGOCALCIFEROL 1.25 MG/1
1.25 MG CAPSULE, LIQUID FILLED ORAL
Qty: 4 | Refills: 0 | Status: ACTIVE | COMMUNITY
Start: 2021-04-27

## 2021-08-29 NOTE — HISTORY OF PRESENT ILLNESS
[FreeTextEntry1] : 55yo M h/o HTN, hyperlipidemia. Cardiac CTA in 2018 showed mild calcified plaque in proximal LAD, LVEF 53%. ECHO in May 2019 showed normal LVEF.\par \par Patient returned to clinic today for f/u. Patient denies active CP, SOB or MCLEAN. Patient recently had blood work from PCP office one month ago. Patient has not been taking prescribed medication due to misunderstanding.\par \par 2D ECHO 05/18/2019 :\par Normal LVEF\par Impaired relaxation\par Mild MR, TR.

## 2021-08-29 NOTE — ASSESSMENT
[FreeTextEntry1] : 57yo M h/o non-obstructive coronary disease.\par Hyperlipidemia, obesity. ? HTN.\par \par Recommend:\par Diet, weight loss discussed again.\par Monitor BP at home.\par Resume Atorvastatin 10 mg daily.\par ASA 81 mg daily. \par Repeat fasting blood work prior to next visit.\par F/u in 6 months.\par \par Ton Flor MD\par \par

## 2021-08-29 NOTE — REVIEW OF SYSTEMS
[Negative] : Gastrointestinal [Fever] : no fever [Chills] : no chills [SOB] : no shortness of breath [Lower Ext Edema] : no extremity edema [Leg Claudication] : no intermittent leg claudication [Palpitations] : no palpitations [Cough] : no cough [Abdominal Pain] : no abdominal pain [Change in Appetite] : no change in appetite [Urinary Frequency] : no change in urinary frequency [Erectile Dysfunction] : no erectile dysfunction [Joint Pain] : no joint pain [Rash] : no rash [Dizziness] : no dizziness [Tremor] : no tremor was seen [Anxiety] : no anxiety

## 2021-08-29 NOTE — PHYSICAL EXAM
[No Acute Distress] : no acute distress [Normal S1, S2] : normal S1, S2 [No Murmur] : no murmur [Clear Lung Fields] : clear lung fields [Good Air Entry] : good air entry [Soft] : abdomen soft [Non Tender] : non-tender [No Edema] : no edema [Moves all extremities] : moves all extremities [Alert and Oriented] : alert and oriented [Well Developed] : well developed [Obese] : obese [Normal Conjunctiva] : normal conjunctiva [Normal Venous Pressure] : normal venous pressure [No Carotid Bruit] : no carotid bruit [No Cyanosis] : no cyanosis [No Rash] : no rash [Normal Speech] : normal speech

## 2021-08-30 ENCOUNTER — RESULT CHARGE (OUTPATIENT)
Age: 56
End: 2021-08-30

## 2022-03-25 ENCOUNTER — APPOINTMENT (OUTPATIENT)
Dept: CARDIOLOGY | Facility: CLINIC | Age: 57
End: 2022-03-25

## 2022-11-22 ENCOUNTER — APPOINTMENT (OUTPATIENT)
Dept: ORTHOPEDIC SURGERY | Facility: CLINIC | Age: 57
End: 2022-11-22

## 2022-12-06 ENCOUNTER — NON-APPOINTMENT (OUTPATIENT)
Age: 57
End: 2022-12-06

## 2023-03-08 NOTE — ED ADULT TRIAGE NOTE - PRO INTERPRETER NEED 2
03/08/23                            Kyler Jo  [address]  [place] [address]    To Whom It May Concern:    This is to certify Kyler Jo was evaluated with Veronica Raya PA-C on 2/27/23 and can return to regular work with no restrictions.     RESTRICTIONS: none            Electronically signed by:  Veronica Raya PA-C  [address] [address]  Dept Phone: [phone]     
English

## 2023-04-07 ENCOUNTER — NON-APPOINTMENT (OUTPATIENT)
Age: 58
End: 2023-04-07

## 2023-09-05 ENCOUNTER — APPOINTMENT (OUTPATIENT)
Dept: CARDIOLOGY | Facility: CLINIC | Age: 58
End: 2023-09-05

## 2023-11-25 NOTE — ASSESSMENT
[FreeTextEntry1] : 55-yo male with h/o non-obstructive coronary disease.\par HTN, hyperlipidemia, obesity.\par \par Recommend:\par Continue diet, weight loss.\par Monitor BP at home.\par Resume Atorvastatin 10 mg daily.\par ASA 81 mg daily.\par F/u blood work scheduled by PMD.\par F/u in 6 months.\par \par Ton Flor MD\par \par 
skin

## 2023-12-24 NOTE — ED ADULT NURSE NOTE - CAS EDN DISCHARGE ASSESSMENT
Awake/Patient baseline mental status/Alert and oriented to person, place and time
Add Progress Note...

## 2024-03-23 ENCOUNTER — NON-APPOINTMENT (OUTPATIENT)
Age: 59
End: 2024-03-23

## 2024-11-13 ENCOUNTER — OUTPATIENT (OUTPATIENT)
Dept: OUTPATIENT SERVICES | Facility: HOSPITAL | Age: 59
LOS: 1 days | End: 2024-11-13
Payer: COMMERCIAL

## 2024-11-13 DIAGNOSIS — Z98.890 OTHER SPECIFIED POSTPROCEDURAL STATES: Chronic | ICD-10-CM

## 2024-11-13 DIAGNOSIS — R05.1 ACUTE COUGH: ICD-10-CM

## 2024-11-13 PROCEDURE — 71046 X-RAY EXAM CHEST 2 VIEWS: CPT | Mod: 26

## 2024-11-13 PROCEDURE — 71046 X-RAY EXAM CHEST 2 VIEWS: CPT

## 2024-11-14 DIAGNOSIS — R05.1 ACUTE COUGH: ICD-10-CM

## 2024-11-27 NOTE — ED ADULT NURSE NOTE - NSSISCREENINGQ1_ED_A_ED
Take medications as indicated for length of time instructed.  Follow-up with your primary care physician.  Return immediately if concerning symptoms, as discussed.    Below are the results of your CT to follow-up with your primary care physician.    IMPRESSION:  1. Obstructing 4 mm stone distal left ureter.  2. Large amount of stool left hemicolon.  3. 1.3 cm cyst right kidney.  4. 5 mm lingular nodule. Incidental Finding:  A non-calcified  pulmonary nodule/multiple non-calcified pulmonary nodules measuring  less than 6 mm, likely benign.  (**-YCF-**)    
No

## 2025-03-16 NOTE — DISCHARGE NOTE ADULT - MEDICATION SUMMARY - MEDICATIONS TO CHANGE
Patient noted to have hyperglycemia all evening and continues to have glucose level greater than 500    Initially increased Lantus to 15 units b.i.d. and got an extra dose  Was also treated with regular insulin 10 units IV  Increased sliding scale    Gave extra 20 U insulin aspart SQ at 0122    Glucose continues to be greater than 500  ABG done to confirm and make sure not going into DKA  Additional Lantus was given  Increase scheduled Lantus to 25 U BID  Additional IV regular insulin 10 U ordered  Will change SSI to every 4 hours PRN  Changed diet to diabetic diet  500 ml fluid bolus ordered    Also discussed with Dr. Bernardo Cao  
I will SWITCH the dose or number of times a day I take the medications listed below when I get home from the hospital:  None